# Patient Record
Sex: MALE | Race: WHITE | Employment: UNEMPLOYED | URBAN - METROPOLITAN AREA
[De-identification: names, ages, dates, MRNs, and addresses within clinical notes are randomized per-mention and may not be internally consistent; named-entity substitution may affect disease eponyms.]

---
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
## 2020-01-01 ENCOUNTER — HOSPITAL ENCOUNTER (INPATIENT)
Age: 20
LOS: 6 days | Discharge: HOME | DRG: 812 | End: 2020-01-07
Attending: EMERGENCY MEDICINE | Admitting: PSYCHIATRY & NEUROLOGY
Payer: MEDICAID

## 2020-01-01 DIAGNOSIS — T43.222A INTENTIONAL OVERDOSE OF SELECTIVE SEROTONIN REUPTAKE INHIBITOR (SSRI), INITIAL ENCOUNTER (HCC): Primary | ICD-10-CM

## 2020-01-01 DIAGNOSIS — R45.851 SUICIDAL IDEATION: ICD-10-CM

## 2020-01-01 DIAGNOSIS — R45.850 HOMICIDAL IDEATIONS: ICD-10-CM

## 2020-01-01 PROBLEM — F32.9 MAJOR DEPRESSION: Status: ACTIVE | Noted: 2020-01-01

## 2020-01-01 PROBLEM — F32.9 MAJOR DEPRESSION: Status: RESOLVED | Noted: 2020-01-01 | Resolved: 2020-01-01

## 2020-01-01 LAB
ALBUMIN SERPL-MCNC: 4.9 G/DL (ref 3.5–5)
ALBUMIN/GLOB SERPL: 1.5 {RATIO} (ref 1.1–2.2)
ALP SERPL-CCNC: 47 U/L (ref 45–117)
ALT SERPL-CCNC: 41 U/L (ref 12–78)
AMORPH CRY URNS QL MICRO: ABNORMAL
AMPHET UR QL SCN: NEGATIVE
ANION GAP SERPL CALC-SCNC: 13 MMOL/L (ref 5–15)
ANION GAP SERPL CALC-SCNC: 18 MMOL/L (ref 5–15)
APAP SERPL-MCNC: <2 UG/ML (ref 10–30)
APPEARANCE UR: ABNORMAL
AST SERPL-CCNC: 23 U/L (ref 15–37)
BACTERIA URNS QL MICRO: NEGATIVE /HPF
BARBITURATES UR QL SCN: NEGATIVE
BASOPHILS # BLD: 0 K/UL (ref 0–0.1)
BASOPHILS NFR BLD: 0 % (ref 0–1)
BENZODIAZ UR QL: NEGATIVE
BILIRUB SERPL-MCNC: 0.6 MG/DL (ref 0.2–1)
BILIRUB UR QL: NEGATIVE
BUN SERPL-MCNC: 10 MG/DL (ref 6–20)
BUN SERPL-MCNC: 8 MG/DL (ref 6–20)
BUN/CREAT SERPL: 12 (ref 12–20)
BUN/CREAT SERPL: 14 (ref 12–20)
CALCIUM SERPL-MCNC: 8.3 MG/DL (ref 8.5–10.1)
CALCIUM SERPL-MCNC: 9.8 MG/DL (ref 8.5–10.1)
CANNABINOIDS UR QL SCN: NEGATIVE
CAOX CRY URNS QL MICRO: ABNORMAL
CHLORIDE SERPL-SCNC: 103 MMOL/L (ref 97–108)
CHLORIDE SERPL-SCNC: 107 MMOL/L (ref 97–108)
CO2 SERPL-SCNC: 23 MMOL/L (ref 21–32)
CO2 SERPL-SCNC: 24 MMOL/L (ref 21–32)
COCAINE UR QL SCN: NEGATIVE
COLOR UR: ABNORMAL
CREAT SERPL-MCNC: 0.69 MG/DL (ref 0.7–1.3)
CREAT SERPL-MCNC: 0.71 MG/DL (ref 0.7–1.3)
DIFFERENTIAL METHOD BLD: NORMAL
DRUG SCRN COMMENT,DRGCM: NORMAL
EOSINOPHIL # BLD: 0 K/UL (ref 0–0.4)
EOSINOPHIL NFR BLD: 0 % (ref 0–7)
EPITH CASTS URNS QL MICRO: ABNORMAL /LPF
ERYTHROCYTE [DISTWIDTH] IN BLOOD BY AUTOMATED COUNT: 12.6 % (ref 11.5–14.5)
ETHANOL SERPL-MCNC: <10 MG/DL
GLOBULIN SER CALC-MCNC: 3.3 G/DL (ref 2–4)
GLUCOSE SERPL-MCNC: 122 MG/DL (ref 65–100)
GLUCOSE SERPL-MCNC: 94 MG/DL (ref 65–100)
GLUCOSE UR STRIP.AUTO-MCNC: NEGATIVE MG/DL
HCT VFR BLD AUTO: 45 % (ref 36.6–50.3)
HGB BLD-MCNC: 16 G/DL (ref 12.1–17)
HGB UR QL STRIP: NEGATIVE
IMM GRANULOCYTES # BLD AUTO: 0 K/UL (ref 0–0.04)
IMM GRANULOCYTES NFR BLD AUTO: 0 % (ref 0–0.5)
KETONES UR QL STRIP.AUTO: NEGATIVE MG/DL
LEUKOCYTE ESTERASE UR QL STRIP.AUTO: NEGATIVE
LYMPHOCYTES # BLD: 1.7 K/UL (ref 0.8–3.5)
LYMPHOCYTES NFR BLD: 19 % (ref 12–49)
MAGNESIUM SERPL-MCNC: 1.8 MG/DL (ref 1.6–2.4)
MCH RBC QN AUTO: 29.4 PG (ref 26–34)
MCHC RBC AUTO-ENTMCNC: 35.6 G/DL (ref 30–36.5)
MCV RBC AUTO: 82.7 FL (ref 80–99)
METHADONE UR QL: NEGATIVE
MONOCYTES # BLD: 0.9 K/UL (ref 0–1)
MONOCYTES NFR BLD: 10 % (ref 5–13)
NEUTS SEG # BLD: 6.3 K/UL (ref 1.8–8)
NEUTS SEG NFR BLD: 71 % (ref 32–75)
NITRITE UR QL STRIP.AUTO: NEGATIVE
NRBC # BLD: 0 K/UL (ref 0–0.01)
NRBC BLD-RTO: 0 PER 100 WBC
OPIATES UR QL: NEGATIVE
PCP UR QL: NEGATIVE
PH UR STRIP: 7.5 [PH] (ref 5–8)
PLATELET # BLD AUTO: 290 K/UL (ref 150–400)
PMV BLD AUTO: 10 FL (ref 8.9–12.9)
POTASSIUM SERPL-SCNC: 2.9 MMOL/L (ref 3.5–5.1)
POTASSIUM SERPL-SCNC: 3.5 MMOL/L (ref 3.5–5.1)
PROT SERPL-MCNC: 8.2 G/DL (ref 6.4–8.2)
PROT UR STRIP-MCNC: NEGATIVE MG/DL
RBC # BLD AUTO: 5.44 M/UL (ref 4.1–5.7)
RBC #/AREA URNS HPF: ABNORMAL /HPF (ref 0–5)
SALICYLATES SERPL-MCNC: <1.7 MG/DL (ref 2.8–20)
SODIUM SERPL-SCNC: 144 MMOL/L (ref 136–145)
SODIUM SERPL-SCNC: 144 MMOL/L (ref 136–145)
SP GR UR REFRACTOMETRY: 1.02 (ref 1–1.03)
UROBILINOGEN UR QL STRIP.AUTO: 1 EU/DL (ref 0.2–1)
WBC # BLD AUTO: 9.1 K/UL (ref 4.1–11.1)
WBC URNS QL MICRO: ABNORMAL /HPF (ref 0–4)

## 2020-01-01 PROCEDURE — 74011250636 HC RX REV CODE- 250/636: Performed by: EMERGENCY MEDICINE

## 2020-01-01 PROCEDURE — 65220000003 HC RM SEMIPRIVATE PSYCH

## 2020-01-01 PROCEDURE — 83735 ASSAY OF MAGNESIUM: CPT

## 2020-01-01 PROCEDURE — 80307 DRUG TEST PRSMV CHEM ANLYZR: CPT

## 2020-01-01 PROCEDURE — 74011250636 HC RX REV CODE- 250/636

## 2020-01-01 PROCEDURE — 74011250637 HC RX REV CODE- 250/637: Performed by: EMERGENCY MEDICINE

## 2020-01-01 PROCEDURE — 81001 URINALYSIS AUTO W/SCOPE: CPT

## 2020-01-01 PROCEDURE — 96375 TX/PRO/DX INJ NEW DRUG ADDON: CPT

## 2020-01-01 PROCEDURE — 74011250637 HC RX REV CODE- 250/637: Performed by: PSYCHIATRY & NEUROLOGY

## 2020-01-01 PROCEDURE — 85025 COMPLETE CBC W/AUTO DIFF WBC: CPT

## 2020-01-01 PROCEDURE — 93005 ELECTROCARDIOGRAM TRACING: CPT

## 2020-01-01 PROCEDURE — 96374 THER/PROPH/DIAG INJ IV PUSH: CPT

## 2020-01-01 PROCEDURE — 36415 COLL VENOUS BLD VENIPUNCTURE: CPT

## 2020-01-01 PROCEDURE — 80053 COMPREHEN METABOLIC PANEL: CPT

## 2020-01-01 PROCEDURE — 99285 EMERGENCY DEPT VISIT HI MDM: CPT

## 2020-01-01 PROCEDURE — 74011000250 HC RX REV CODE- 250: Performed by: EMERGENCY MEDICINE

## 2020-01-01 PROCEDURE — 96376 TX/PRO/DX INJ SAME DRUG ADON: CPT

## 2020-01-01 RX ORDER — ONDANSETRON 2 MG/ML
4 INJECTION INTRAMUSCULAR; INTRAVENOUS
Status: COMPLETED | OUTPATIENT
Start: 2020-01-01 | End: 2020-01-01

## 2020-01-01 RX ORDER — LORAZEPAM 2 MG/ML
1 INJECTION INTRAMUSCULAR
Status: DISCONTINUED | OUTPATIENT
Start: 2020-01-01 | End: 2020-01-07 | Stop reason: HOSPADM

## 2020-01-01 RX ORDER — LORAZEPAM 2 MG/ML
INJECTION INTRAMUSCULAR
Status: COMPLETED
Start: 2020-01-01 | End: 2020-01-01

## 2020-01-01 RX ORDER — LORAZEPAM 2 MG/ML
1 INJECTION INTRAMUSCULAR
Status: COMPLETED | OUTPATIENT
Start: 2020-01-01 | End: 2020-01-01

## 2020-01-01 RX ORDER — FLUOXETINE HYDROCHLORIDE 60 MG/1
TABLET, FILM COATED ORAL; ORAL
COMMUNITY
End: 2020-01-07

## 2020-01-01 RX ORDER — BENZTROPINE MESYLATE 1 MG/1
1 TABLET ORAL
Status: DISCONTINUED | OUTPATIENT
Start: 2020-01-01 | End: 2020-01-07 | Stop reason: HOSPADM

## 2020-01-01 RX ORDER — HYDROXYZINE 25 MG/1
50 TABLET, FILM COATED ORAL
Status: DISCONTINUED | OUTPATIENT
Start: 2020-01-01 | End: 2020-01-07 | Stop reason: HOSPADM

## 2020-01-01 RX ORDER — CLONIDINE HYDROCHLORIDE 0.1 MG/1
0.2 TABLET ORAL
Status: DISCONTINUED | OUTPATIENT
Start: 2020-01-01 | End: 2020-01-07 | Stop reason: HOSPADM

## 2020-01-01 RX ORDER — ACETAMINOPHEN 325 MG/1
650 TABLET ORAL
Status: DISCONTINUED | OUTPATIENT
Start: 2020-01-01 | End: 2020-01-07 | Stop reason: HOSPADM

## 2020-01-01 RX ORDER — DIPHENHYDRAMINE HYDROCHLORIDE 50 MG/ML
50 INJECTION, SOLUTION INTRAMUSCULAR; INTRAVENOUS
Status: DISCONTINUED | OUTPATIENT
Start: 2020-01-01 | End: 2020-01-07 | Stop reason: HOSPADM

## 2020-01-01 RX ORDER — POTASSIUM CHLORIDE 750 MG/1
40 TABLET, FILM COATED, EXTENDED RELEASE ORAL
Status: COMPLETED | OUTPATIENT
Start: 2020-01-01 | End: 2020-01-01

## 2020-01-01 RX ORDER — GABAPENTIN 300 MG/1
600 CAPSULE ORAL
Status: COMPLETED | OUTPATIENT
Start: 2020-01-01 | End: 2020-01-01

## 2020-01-01 RX ORDER — FLUOXETINE HYDROCHLORIDE 20 MG/1
40 CAPSULE ORAL DAILY
Status: DISCONTINUED | OUTPATIENT
Start: 2020-01-02 | End: 2020-01-07 | Stop reason: HOSPADM

## 2020-01-01 RX ORDER — ADHESIVE BANDAGE
30 BANDAGE TOPICAL DAILY PRN
Status: DISCONTINUED | OUTPATIENT
Start: 2020-01-01 | End: 2020-01-07 | Stop reason: HOSPADM

## 2020-01-01 RX ORDER — SODIUM CHLORIDE 0.9 % (FLUSH) 0.9 %
10 SYRINGE (ML) INJECTION AS NEEDED
Status: DISCONTINUED | OUTPATIENT
Start: 2020-01-01 | End: 2020-01-01

## 2020-01-01 RX ORDER — OLANZAPINE 5 MG/1
5 TABLET ORAL
Status: DISCONTINUED | OUTPATIENT
Start: 2020-01-01 | End: 2020-01-07 | Stop reason: HOSPADM

## 2020-01-01 RX ORDER — CLONIDINE HYDROCHLORIDE 0.2 MG/1
TABLET ORAL 2 TIMES DAILY
COMMUNITY
End: 2020-01-07

## 2020-01-01 RX ORDER — HALOPERIDOL 5 MG/ML
5 INJECTION INTRAMUSCULAR
Status: DISCONTINUED | OUTPATIENT
Start: 2020-01-01 | End: 2020-01-07 | Stop reason: HOSPADM

## 2020-01-01 RX ORDER — TRAZODONE HYDROCHLORIDE 50 MG/1
50 TABLET ORAL
Status: DISCONTINUED | OUTPATIENT
Start: 2020-01-01 | End: 2020-01-07 | Stop reason: HOSPADM

## 2020-01-01 RX ADMIN — LORAZEPAM 1 MG: 2 INJECTION, SOLUTION INTRAMUSCULAR; INTRAVENOUS at 01:22

## 2020-01-01 RX ADMIN — GABAPENTIN 600 MG: 300 CAPSULE ORAL at 17:48

## 2020-01-01 RX ADMIN — CLONIDINE HYDROCHLORIDE 0.2 MG: 0.1 TABLET ORAL at 21:25

## 2020-01-01 RX ADMIN — ONDANSETRON 4 MG: 2 SOLUTION INTRAMUSCULAR; INTRAVENOUS at 08:04

## 2020-01-01 RX ADMIN — SODIUM CHLORIDE 1000 ML: 900 INJECTION, SOLUTION INTRAVENOUS at 01:21

## 2020-01-01 RX ADMIN — ACTIVATED CHARCOAL 50 G: 208 SUSPENSION ORAL at 02:41

## 2020-01-01 RX ADMIN — POTASSIUM CHLORIDE 40 MEQ: 750 TABLET, EXTENDED RELEASE ORAL at 02:58

## 2020-01-01 RX ADMIN — LORAZEPAM 1 MG: 2 INJECTION INTRAMUSCULAR at 01:22

## 2020-01-01 RX ADMIN — ONDANSETRON 4 MG: 2 SOLUTION INTRAMUSCULAR; INTRAVENOUS at 02:13

## 2020-01-01 RX ADMIN — TRAZODONE HYDROCHLORIDE 50 MG: 50 TABLET ORAL at 21:26

## 2020-01-01 RX ADMIN — GABAPENTIN 600 MG: 300 CAPSULE ORAL at 21:26

## 2020-01-01 RX ADMIN — LORAZEPAM 1 MG: 2 INJECTION, SOLUTION INTRAMUSCULAR; INTRAVENOUS at 09:45

## 2020-01-01 RX ADMIN — LURASIDONE HYDROCHLORIDE 20 MG: 20 TABLET, FILM COATED ORAL at 17:49

## 2020-01-01 RX ADMIN — Medication 10 ML: at 09:45

## 2020-01-01 RX ADMIN — ONDANSETRON 4 MG: 2 SOLUTION INTRAMUSCULAR; INTRAVENOUS at 09:48

## 2020-01-01 RX ADMIN — GABAPENTIN 600 MG: 300 CAPSULE ORAL at 12:57
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
## 2020-01-01 NOTE — ED NOTES
TRANSFER - OUT REPORT:    Verbal report given to Chanel Orta RN on [facility]  being transferred to behavioral health general for routine progression of care       Report consisted of patients Situation, Background, Assessment and   Recommendations(SBAR). Information from the following report(s) SBAR, ED Summary, [facility] and Recent Results was reviewed with the receiving nurse. Lines:       Opportunity for questions and clarification was provided.       Patient transported with: security
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
## 2020-01-01 NOTE — ED NOTES
Spoke with Carlita Marks from [facility]. She has concerns of Anion Gap and suggests BMP redraw at 0500. MD BaileyCecilia aware.

## 2020-01-01 NOTE — BSMART NOTE
Patient is medically clear as of 8am as reported by ED staff. Labs that Poison Control were concerned about are good.
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
## 2020-01-01 NOTE — ED NOTES
Bedside and Verbal shift change report given to [address] (oncoming nurse) by Justyn Guevara (offgoing nurse). Report included the following information SBAR, Kardex, ED Summary, [facility] and Recent Results.

## 2020-01-01 NOTE — ED NOTES
Pt alert and oriented, in no acute distress, resting in bed with bed in low position and wheels locked, call bell in reach. Pt's grandmother at bedside. Pt has hiccups, reports he has had them for 3 days. Pt encouraged to try vagal maneuvers. Pt denies pain. Pt asked about our behavioral health unit. Pt reports his last behavioral health admission was about three years ago. He has only experienced admission to pediatric behavioral health units.

## 2020-01-01 NOTE — ED NOTES
Pt reporting SI and overdose on fluoxetine x30 mins PTA in ED (16 pills of 60 mg fluoxetine). Pt reporting suicide attempt x30 mins ago by overdose. Pt also reporting HI towards mom and dad yesterday. Pt reports he has been admitted [facility] for mental health 3-4 years ago. He is very compliant with staff and would like psychiatric help after being medically cleared. Mild hyperreflexia noted to bilateral lower extremities x2. Pt is diaphoretic and skin is hot and red upon first assessment. He is reporting feeling anxious and visibly restless. Call bell within reach, safety precautions in place, bed locked and in the lowest position. His grandmother whom he lives with is very supportive and at bedside. He does feel comfortable talking about things in front of her. Emergency Department Nursing Plan of Care       The Nursing Plan of Care is developed from the Nursing assessment and Emergency Department Attending provider initial evaluation. The plan of care may be reviewed in the ED Provider note.     The Plan of Care was developed with the following considerations:   Patient / Family readiness to learn indicated by:verbalized understanding  Persons(s) to be included in education: patient  Barriers to Learning/Limitations:No    Signed     Judie Sykes RN    1/1/2020   7:07 AM

## 2020-01-01 NOTE — ED NOTES
Pt's grandmother left and will await a call from patient with his access code to visit patient during 3rd floor visiting hours.

## 2020-01-01 NOTE — ED TRIAGE NOTES
C/o mental health problem and overdose on fluoxitine x 30 mins PTA in ED (16 pills of 60 mg fluoxitine). Pt reporting suicide attempt 30 mins ago by overdose. Pt also reporting HI towards mom and dad yesterday. Pt reports he has been admitted to [facility] for mental health 3-4 years ago.
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
## 2020-01-01 NOTE — ED NOTES
Spoke with Maylin Rios from [facility]; recommended 50mg activated charcoal for this pt and observation x8hrs minimum. Also recommends PRN IV benzodiazepines. MD Cecilia aware.
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
## 2020-01-02 LAB
ATRIAL RATE: 97 BPM
CALCULATED P AXIS, ECG09: 62 DEGREES
CALCULATED R AXIS, ECG10: 20 DEGREES
CALCULATED T AXIS, ECG11: 31 DEGREES
DIAGNOSIS, 93000: NORMAL
P-R INTERVAL, ECG05: 118 MS
Q-T INTERVAL, ECG07: 348 MS
QRS DURATION, ECG06: 86 MS
QTC CALCULATION (BEZET), ECG08: 441 MS
VENTRICULAR RATE, ECG03: 97 BPM

## 2020-01-02 PROCEDURE — 74011250637 HC RX REV CODE- 250/637: Performed by: PSYCHIATRY & NEUROLOGY

## 2020-01-02 PROCEDURE — 65220000003 HC RM SEMIPRIVATE PSYCH

## 2020-01-02 RX ORDER — GABAPENTIN 300 MG/1
600 CAPSULE ORAL
Status: DISCONTINUED | OUTPATIENT
Start: 2020-01-02 | End: 2020-01-07 | Stop reason: HOSPADM

## 2020-01-02 RX ADMIN — CLONIDINE HYDROCHLORIDE 0.2 MG: 0.1 TABLET ORAL at 22:00

## 2020-01-02 RX ADMIN — LURASIDONE HYDROCHLORIDE 20 MG: 20 TABLET, FILM COATED ORAL at 16:58

## 2020-01-02 RX ADMIN — TRAZODONE HYDROCHLORIDE 50 MG: 50 TABLET ORAL at 22:00

## 2020-01-02 NOTE — BH NOTES
1900-Patient report received from Kierra Mayes RN    1950-Patient denies SI/HI and AVH. Patient was very tearful. Patient stated he felt safe here and was hopeful for a good outcome. Patient expressed being very tired and happy to just rest in the dayroom. 2125-Patient stats he still feels very tired and was resting in his room.  Patient given scheduled meds along with prn trazodone for sleep 50 mg po    0625- patient slept 10 hours
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
## 2020-01-02 NOTE — GROUP NOTE
LAYNE  GROUP DOCUMENTATION INDIVIDUAL                                                                          Group Therapy Note    Date: 1/2/2020    Group Start Time: 1000  Group End Time: 1100  Group Topic: Topic Group    [address] [place] [address] 3 ACUTE BEHAV [facility], [facility] GROUP DOCUMENTATION GROUP    Group Therapy Note    Attendees: 4         Attendance: Attended    Patient's Goal:  To develop a personal plan for success    Interventions/techniques: Supported-positive coping strategies, goals    Follows Directions:  Followed directions    Interactions: Interacted appropriately    Mental Status: Calm    Behavior/appearance: Attentive, Cooperative and Needed prompting    Goals Achieved: Able to engage in interactions, Able to listen to others and Discussed coping      Additional Notes:      Edgar Padilla

## 2020-01-02 NOTE — PROGRESS NOTES
Laboratory monitoring for mood stabilizer and antipsychotics:    Recommended baseline monitoring has been completed based on this patient's current medication regimen. The patient is currently taking the following medication(s):   Current Facility-Administered Medications   Medication Dose Route Frequency    lurasidone (LATUDA) tablet 20 mg  20 mg Oral DAILY WITH DINNER    FLUoxetine (PROzac) capsule 40 mg  40 mg Oral DAILY    cloNIDine HCl (CATAPRES) tablet 0.2 mg  0.2 mg Oral QHS    influenza vaccine 2019-20 (6 mos+)(PF) (FLUARIX/FLULAVAL/FLUZONE QUAD) injection 0.5 mL  0.5 mL IntraMUSCular PRIOR TO DISCHARGE       Height, Weight, BMI Estimation  Estimated body mass index is 23.73 kg/m² as calculated from the following:    Height as of this encounter: 167.6 cm (66\"). Weight as of this encounter: 66.7 kg (147 lb). Renal Function, Hepatic Function and Chemistry  Estimated Creatinine Clearance: 151.9 mL/min (A) (by C-G formula based on SCr of 0.69 mg/dL (L)). Lab Results   Component Value Date/Time    Sodium 144 01/01/2020 05:29 AM    Potassium 3.5 01/01/2020 05:29 AM    Chloride 107 01/01/2020 05:29 AM    CO2 24 01/01/2020 05:29 AM    Anion gap 13 01/01/2020 05:29 AM    Glucose 122 (H) 01/01/2020 05:29 AM    BUN 8 01/01/2020 05:29 AM    Creatinine 0.69 (L) 01/01/2020 05:29 AM    BUN/Creatinine ratio 12 01/01/2020 05:29 AM    GFR est AA >60 01/01/2020 05:29 AM    GFR est non-AA >60 01/01/2020 05:29 AM    Calcium 8.3 (L) 01/01/2020 05:29 AM    ALT (SGPT) 41 01/01/2020 01:05 AM    AST (SGOT) 23 01/01/2020 01:05 AM    Alk.  phosphatase 47 01/01/2020 01:05 AM    Protein, total 8.2 01/01/2020 01:05 AM    Albumin 4.9 01/01/2020 01:05 AM    Globulin 3.3 01/01/2020 01:05 AM    A-G Ratio 1.5 01/01/2020 01:05 AM    Bilirubin, total 0.6 01/01/2020 01:05 AM       Lab Results   Component Value Date/Time    Glucose 122 (H) 01/01/2020 05:29 AM    Glucose (POC) 98 12/26/2014 10:22 PM       Lab Results   Component Value Date/Time    Hemoglobin A1c 4.8 01/03/2020 05:06 AM       Hematology  Lab Results   Component Value Date/Time    WBC 9.1 01/01/2020 01:05 AM    HGB 16.0 01/01/2020 01:05 AM    HCT 45.0 01/01/2020 01:05 AM    PLATELET 770 37/30/6069 01:05 AM    MCV 82.7 01/01/2020 01:05 AM       Lipids  Lab Results   Component Value Date/Time    Cholesterol, total 151 01/03/2020 05:06 AM    HDL Cholesterol 42 01/03/2020 05:06 AM    LDL, calculated 87.4 01/03/2020 05:06 AM    Triglyceride 108 01/03/2020 05:06 AM    CHOL/HDL Ratio 3.6 01/03/2020 05:06 AM       Thyroid Function    Lab Results   Component Value Date/Time    TSH 4.190 04/12/2018 10:53 AM     Vitals  Visit Vitals  /67 (BP 1 Location: Left arm, BP Patient Position: Sitting)   Pulse 97   Temp 97.4 °F (36.3 °C)   Resp 17   Ht 167.6 cm (66\")   Wt 66.7 kg (147 lb)   SpO2 96%   BMI 23.73 kg/m²       Ruddy Lara, PharmD, BCPS  [phone] (pharmacy)

## 2020-01-02 NOTE — PROGRESS NOTES
Problem: Depressed Mood (Adult/Pediatric)  Goal: *STG: Participates in treatment plan  Outcome: Progressing Towards Goal  Goal: *STG: Verbalizes anger, guilt, and other feelings in a constructive manor  Outcome: Not Progressing Towards Goal  Goal: *STG: Attends activities and groups  Outcome: Progressing Towards Goal  Goal: *STG: Demonstrates reduction in symptoms and increase in insight into coping skills/future focused  Outcome: Progressing Towards Goal  Goal: *STG: Remains safe in hospital  Outcome: Progressing Towards Goal  Goal: *STG: Complies with medication therapy  Outcome: Progressing Towards Goal  Goal: *LTG: Returns to previous level of functioning and participates with after care plan  Outcome: Progressing Towards Goal  Goal: *LTG: Understands illness and can identify signs of relapse  Outcome: Progressing Towards Goal     0721 This writer received report from the outgoing nurse. 0830 Pt ate some of his breakfast.     0900 Pt in room resting quietly. Pt presents with a cooperative attitude, sad/flat affect. Pt presents with a depressed mood. Pt denied SI, HI, and AVH. Pt is alert and oriented x 4.     1015 Pt in room resting. Pt has been isolative to the room. Pt keeping to self.     1201 Pt in room resting quietly. Pt with orders for hemoglobin A1C and lipid panel labs tomorrow. 80 Pt visiting with family.
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
## 2020-01-02 NOTE — PROGRESS NOTES
Problem: Depressed Mood (Adult/Pediatric)  Goal: *STG: Attends activities and groups  Outcome: Progressing Towards Goal     Problem: Depressed Mood (Adult/Pediatric)  Goal: *STG: Remains safe in hospital  Outcome: Progressing Towards Goal

## 2020-01-02 NOTE — GROUP NOTE
LAYNE  GROUP DOCUMENTATION INDIVIDUAL                                                                          Group Therapy Note    Date: 1/2/2020    Group Start Time: 1500  Group End Time: 1600  Group Topic: Recreational/Music Therapy    [facility] [place] [facility] ACUTE BEHAV [facility]    [place] [facility] GROUP DOCUMENTATION GROUP    Group Therapy Note    Attendees: 8         Attendance: Attended    Patient's Goal:  To concentrate on selected task    Interventions/techniques: Supported-crafts,games,music    Follows Directions:  Followed directions    Interactions: Interacted appropriately    Mental Status: Calm    Behavior/appearance: Attentive and Cooperative    Goals Achieved: Able to engage in interactions and Able to listen to others      Additional Notes:      Judit Veronica

## 2020-01-02 NOTE — BH NOTES
Behavioral Health Interdisciplinary Rounds     Patient Name: Vannessa Burnett  Age: 20 y.o. Room/Bed:  326/01  Primary Diagnosis: MDD (major depressive disorder)   Admission Status: Voluntary     Readmission within 30 days: no  Power of  in place: no  Patient requires a blocked bed: no            Reason for blocked bed: n/a  Order for blocked bed obtained: no       Sleep hours: 10       Participation in Care/Groups:  no  Medication Compliant?: Yes  PRNS (last 24 hours): Sleep Aid    Restraints (last 24 hours):  no  Substance Abuse:  no    24 hour chart check complete: yes     Behavioral Health Treatment Team Note     Patient goal(s) for today: Engage in unit activities. Treatment team focus/goals: Continue stabilizing and gain collateral from family. Progress note:  Pt endorses SI.  Pt's mood is depressed and affect is sad, tearful. Pt reports remaining anxious but had a good conversation with twin brother. LOS:  1  Expected LOS: TBD    Insurance info/prescription coverage:  Medicaid  Date of last family contact: Awaiting return call from sisterSandi Villanueva - sister [phone]  Awaiting return call. Grandmother -Rollo Goad  Family requesting physician contact today:  No  Discharge plan:  Grandmother's   Guns in the home:  None involved. Outpatient provider(s): Warner Castorena and therapist    Participating treatment team members: RICARDO Ruiz and Dr. Jeff Bazzi.

## 2020-01-03 LAB
CHOLEST SERPL-MCNC: 151 MG/DL
EST. AVERAGE GLUCOSE BLD GHB EST-MCNC: 91 MG/DL
HBA1C MFR BLD: 4.8 % (ref 4–5.6)
HDLC SERPL-MCNC: 42 MG/DL
HDLC SERPL: 3.6 {RATIO} (ref 0–5)
LDLC SERPL CALC-MCNC: 87.4 MG/DL (ref 0–100)
LIPID PROFILE,FLP: NORMAL
TRIGL SERPL-MCNC: 108 MG/DL (ref ?–150)
VLDLC SERPL CALC-MCNC: 21.6 MG/DL

## 2020-01-03 PROCEDURE — 74011250637 HC RX REV CODE- 250/637: Performed by: PSYCHIATRY & NEUROLOGY

## 2020-01-03 PROCEDURE — 36415 COLL VENOUS BLD VENIPUNCTURE: CPT

## 2020-01-03 PROCEDURE — 80061 LIPID PANEL: CPT

## 2020-01-03 PROCEDURE — 83036 HEMOGLOBIN GLYCOSYLATED A1C: CPT

## 2020-01-03 PROCEDURE — 65220000003 HC RM SEMIPRIVATE PSYCH

## 2020-01-03 RX ADMIN — CLONIDINE HYDROCHLORIDE 0.2 MG: 0.1 TABLET ORAL at 21:09

## 2020-01-03 RX ADMIN — LURASIDONE HYDROCHLORIDE 20 MG: 20 TABLET, FILM COATED ORAL at 17:32

## 2020-01-03 RX ADMIN — HYDROXYZINE HYDROCHLORIDE 50 MG: 25 TABLET, FILM COATED ORAL at 21:09

## 2020-01-03 RX ADMIN — TRAZODONE HYDROCHLORIDE 50 MG: 50 TABLET ORAL at 21:09

## 2020-01-03 NOTE — GROUP NOTE
LAYNE  GROUP DOCUMENTATION INDIVIDUAL                                                                          Group Therapy Note    Date: 1/3/2020    Group Start Time: 1000  Group End Time: 1100  Group Topic: Topic Group    [facility] [place] 3 ACUTE BEHAV [facility]    [place] [facility] GROUP DOCUMENTATION GROUP    Group Therapy Note    Attendees: 7         Attendance: Attended    Patient's Goal:  To participate in healthy relationships [employer] game    Interventions/techniques: Supported-things pertaining to relationships    Follows Directions:  Followed directions    Interactions: Interacted appropriately    Mental Status: Calm and Flat    Behavior/appearance: Attentive, Cooperative and Needed prompting    Goals Achieved: Able to engage in interactions and Able to listen to others      Additional Notes:      Margot Reinoso

## 2020-01-03 NOTE — BH NOTES
Pt has sad and flat affect. Pt denies suicidal and homicidal ideations; denies hallucinations. Pt is med compliant and attends groups. Pt eats all meals. No acute distress; continue to monitor and provide support when needed.

## 2020-01-03 NOTE — GROUP NOTE
LAYNE  GROUP DOCUMENTATION INDIVIDUAL                                                                          Group Therapy Note    Date: 1/3/2020    Group Start Time: 1500  Group End Time: 1600  Group Topic: Recreational/Music Therapy    [facility] [place] [address] ACUTE BEHAV [facility]    [place] [facility] GROUP DOCUMENTATION GROUP    Group Therapy Note    Attendees: 6         Attendance: Attended    Patient's Goal:  To concentrate on selected task    Interventions/techniques: Supported-crafts,games,music    Follows Directions:  Followed directions    Interactions: Interacted appropriately    Mental Status: flat,calm    Behavior/appearance: Attentive, Cooperative and Needed prompting    Goals Achieved: Able to engage in interactions and Able to listen to others -completed selected task with encouragement    Additional Notes:      Dewayne Garcia

## 2020-01-03 NOTE — BH NOTES
Behavioral Health Interdisciplinary Rounds     Patient Name: Taj Calle  Age: 20 y.o. Room/Bed:  326/01  Primary Diagnosis: MDD (major depressive disorder)   Admission Status: Voluntary     Readmission within 30 days: no  Power of  in place: no  Patient requires a blocked bed: no          Reason for blocked bed:     Sleep hours: 8.5       Participation in Care/Groups:  yes  Medication Compliant?: Yes  PRNS (last 24 hours): Sleep Aid    Restraints (last 24 hours):  no  Substance Abuse:  no    24 hour chart check complete: no     Patient goal(s) for today: Engage in unit activities. Treatment team focus/goals: Continue stabilizing on medication and contact support. Progress note: Pt communicating more with staff and amendable to start outpatient therapy. LOS:  2  Expected LOS:     Financial concerns/prescription coverage:  no  Family contact:  Sister- Hernandez Sandhu      Family requesting physician contact today:  no  Discharge plan: Grandmothers  Access to weapons: no        Outpatient provider(s): Warner HENDRICKSON  Patient's preferred phone number for follow up call:     Participating treatment team members: RICARDO Menchaca and Dr. Jaz Serrano.
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
## 2020-01-03 NOTE — GROUP NOTE
[facility] GROUP DOCUMENTATION INDIVIDUAL                                                                          Group Therapy Note    Date: 1/3/2020    Group Start Time: 7394  Group End Time: 3112  Group Topic: Social Work Group    [address] [facility]    [address] [facility] GROUP DOCUMENTATION GROUP    Group Therapy Note    Attendees: 6         Attendance: Attended    Patient's Goal:   Pt designed a \"life motto\" about what keeps him positive when he is depressed      Interventions/techniques: Challenged, Informed, Validated and Promoted peer support    Follows Directions: Followed directions    Interactions: Interacted appropriately    Mental Status: Anxious and Depressed    Behavior/appearance: Attentive and Cooperative    Goals Achieved: Able to engage in interactions, Able to listen to others, Able to give feedback to another and Able to reflect/comment on own behavior       Additional Notes:  Pt stated that he \"tries to remember to breathe\" when he is anxious. Pt interacted well with peers.   Kendal Lombardo

## 2020-01-03 NOTE — PROGRESS NOTES
Problem: Depressed Mood (Adult/Pediatric)  Goal: *STG: Participates in treatment plan  Outcome: Progressing Towards Goal  Goal: *STG: Participates in 1:1 therapy sessions  Outcome: Progressing Towards Goal  Goal: *STG: Verbalizes anger, guilt, and other feelings in a constructive manor  Outcome: Progressing Towards Goal  Goal: *STG: Attends activities and groups  Outcome: Progressing Towards Goal  Goal: *STG: Remains safe in hospital  Outcome: Progressing Towards Goal  Goal: *STG: Complies with medication therapy  Outcome: Progressing Towards Goal     Problem: Falls - Risk of  Goal: *Absence of Falls  Description  Document Jacquelyn Fall Risk and appropriate interventions in the flowsheet.   Outcome: Progressing Towards Goal  Note: Fall Risk Interventions:

## 2020-01-03 NOTE — BH NOTES
1900-Received report from Valerie Colorado, GERARDO    Patient is up in dayroom at beginning of shift working on a crossword puzzle. He is cooperative and pleasant. He is quiet and isolative not interacting with peers. Patient denies SI, HI and AVH. He requested something to help him sleep with his bedtime medications.  PRN Trazodone given at 2200.     0515-Labs drawn succssfully    Patient slept for 8.5 hours

## 2020-01-04 PROCEDURE — 74011250637 HC RX REV CODE- 250/637: Performed by: PSYCHIATRY & NEUROLOGY

## 2020-01-04 PROCEDURE — 65220000003 HC RM SEMIPRIVATE PSYCH

## 2020-01-04 RX ADMIN — TRAZODONE HYDROCHLORIDE 50 MG: 50 TABLET ORAL at 21:39

## 2020-01-04 RX ADMIN — CLONIDINE HYDROCHLORIDE 0.2 MG: 0.1 TABLET ORAL at 21:39

## 2020-01-04 RX ADMIN — FLUOXETINE 40 MG: 20 CAPSULE ORAL at 09:04

## 2020-01-04 RX ADMIN — HYDROXYZINE HYDROCHLORIDE 50 MG: 25 TABLET, FILM COATED ORAL at 21:39

## 2020-01-04 RX ADMIN — LURASIDONE HYDROCHLORIDE 20 MG: 20 TABLET, FILM COATED ORAL at 17:41

## 2020-01-04 NOTE — BH NOTES
Report received from Jo Vásquez RN. Patient currently in dayroom watching TV.    2005  Patient denies SI/HI at this time. Patient does endorse anxiety due to being the youngest patient on the floor, but states he is usually anxious all the time. 2109  Patient given scheduled Clonodine and prn atarax for anxiety and trazodone for sleep. 0020  Patient in bed sleeping. 0330  Patient in bed sleeping. 4584  Patient remained in bed throughout night.       Patient slept 8 hours

## 2020-01-04 NOTE — PROGRESS NOTES
Problem: Suicide  Goal: *ABLE TO CONTROL ANXIETY RESPONSE  Outcome: Progressing Towards Goal     Problem: Depressed Mood (Adult/Pediatric)  Goal: *STG: Participates in treatment plan  Outcome: Progressing Towards Goal  Goal: *STG: Demonstrates reduction in symptoms and increase in insight into coping skills/future focused  Outcome: Progressing Towards Goal     Problem: Depressed Mood (Adult/Pediatric)  Goal: *STG: Complies with medication therapy  Outcome: Resolved/Met

## 2020-01-04 NOTE — BH NOTES
Behavioral Health Interdisciplinary Rounds     Patient Name: Froy Hurd  Age: 20 y.o. Room/Bed:  [facility]/  Primary Diagnosis: MDD (major depressive disorder)   Admission Status: Voluntary     Readmission within 30 days: no  Power of  in place: no  Patient requires a blocked bed: no            Reason for blocked bed: n/a  Order for blocked bed obtained: no       Sleep hours: 8       Participation in Care/Groups:  yes  Medication Compliant?: Yes  PRNS (last 24 hours):  Antianxiety and Sleep Aid    Restraints (last 24 hours):  no  Substance Abuse:  no    24 hour chart check complete: yes

## 2020-01-04 NOTE — PROGRESS NOTES
Problem: Suicide  Goal: *ABLE TO CONTROL ANXIETY RESPONSE  Outcome: Progressing Towards Goal     Problem: Post Traumatic Stress (Adult/Pediatric)  Goal: *ABLE TO CONTROL ANXIETY RESPONSE  Outcome: Progressing Towards Goal     Problem: Depressed Mood (Adult/Pediatric)  Goal: *STG: Participates in treatment plan  Outcome: Progressing Towards Goal  Goal: *STG: Participates in 1:1 therapy sessions  Outcome: Progressing Towards Goal  Goal: *STG: Verbalizes anger, guilt, and other feelings in a constructive manor  Outcome: Progressing Towards Goal  Goal: *STG: Attends activities and groups  Outcome: Progressing Towards Goal  Goal: *STG: Demonstrates reduction in symptoms and increase in insight into coping skills/future focused  Outcome: Progressing Towards Goal  Goal: *STG: Remains safe in hospital  Outcome: Progressing Towards Goal  Goal: *LTG: Returns to previous level of functioning and participates with after care plan  Outcome: Progressing Towards Goal  Goal: *LTG: Understands illness and can identify signs of relapse  Outcome: Progressing Towards Goal  Goal: Interventions  Outcome: Progressing Towards Goal     Problem: Patient Education: Go to Patient Education Activity  Goal: Patient/Family Education  Outcome: Progressing Towards Goal     Problem: Falls - Risk of  Goal: *Absence of Falls  Description  Document You Gomez Fall Risk and appropriate interventions in the flowsheet.   Outcome: Progressing Towards Goal  Note: Fall Risk Interventions:                                Problem: Patient Education: Go to Patient Education Activity  Goal: Patient/Family Education  Outcome: Progressing Towards Goal

## 2020-01-04 NOTE — GROUP NOTE
[facility] GROUP DOCUMENTATION INDIVIDUAL                                                                          Group Therapy Note    Date: 1/4/2020    Group Start Time: 1430  Group End Time: 1630  Group Topic: Social Work Group    [facility] [place]    [address] [facility] GROUP DOCUMENTATION GROUP    Group Therapy Note    Attendees: 7         Attendance: Attended    Patient's Goal:  Pt watched a film about emotions and how they affect the thought process. Pt discussed how emotions take over and things may not be rational.     Interventions/techniques: Art integration, Challenged, Validated and Provide feedback    Follows Directions: Followed directions    Interactions: Interacted appropriately    Mental Status: Anxious and Depressed    Behavior/appearance: Attentive and Cooperative    Goals Achieved: Able to engage in interactions, Able to listen to others and Able to manage/cope with feelings      Additional Notes:  Pt was emotional at times during the film.  Pt stated that he could relate to how his emotions can take over, and cause his thoughts to \"go to dark places\"    Josh Correa

## 2020-01-04 NOTE — GROUP NOTE
[facility]  GROUP DOCUMENTATION INDIVIDUAL                                                                          Group Therapy Note    Date: 1/4/2020    Group Start Time: 0930  Group End Time: 8023  Group Topic: Social Work Group    [address] [facility]    [address] [facility] GROUP DOCUMENTATION GROUP    Group Therapy Note    Attendees:5         Attendance: Attended    Patient's Goal:   Pt worked with group to create a \"body map\" of where anxiety can be felt in the body. Pt mapped out where in the body he felt the most anxious. Pt completed a Progressive Muscle Relaxation Technique where he could focus on where he felt anxious, and release it. Interventions/techniques: Art integration, Challenged, Informed, Validated, Promoted peer support, Provide feedback and Reinforced    Follows Directions: Followed directions    Interactions: Interacted appropriately    Mental Status: Anxious and Depressed    Behavior/appearance: Attentive and Cooperative    Goals Achieved: Able to engage in interactions, Able to listen to others, Able to give feedback to another, Able to reflect/comment on own behavior, Able to manage/cope with feelings and Able to receive feedback    Additional Notes:  Pt offered excellent feedback to peers and was engaged in group. Pt stated he feels his anxiety all through his midsection.  Pt had a hard time with the PMR exercise    Bella Lopez

## 2020-01-04 NOTE — BH NOTES
0800 pt is visible on the unit. Ate breakfast. Denies si/hi/a/v barreto. Pt states he feels anxious and depressed. Sad. Flat. Affect. Medication complaint. 1000 pt is resting in room. 1200 pt is visible on the unit. Calm and cooperative. Ate lunch. 1400 pt In resting in room. 1600 pt is visible on the unit. Watching tv.     1800 ate lunch. Pt is resting in room.
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
## 2020-01-05 PROCEDURE — 65220000003 HC RM SEMIPRIVATE PSYCH

## 2020-01-05 PROCEDURE — 74011250637 HC RX REV CODE- 250/637: Performed by: PSYCHIATRY & NEUROLOGY

## 2020-01-05 RX ADMIN — FLUOXETINE 40 MG: 20 CAPSULE ORAL at 08:31

## 2020-01-05 RX ADMIN — TRAZODONE HYDROCHLORIDE 50 MG: 50 TABLET ORAL at 22:17

## 2020-01-05 RX ADMIN — HYDROXYZINE HYDROCHLORIDE 50 MG: 25 TABLET, FILM COATED ORAL at 22:17

## 2020-01-05 RX ADMIN — LURASIDONE HYDROCHLORIDE 20 MG: 20 TABLET, FILM COATED ORAL at 17:01

## 2020-01-05 RX ADMIN — CLONIDINE HYDROCHLORIDE 0.2 MG: 0.1 TABLET ORAL at 22:17

## 2020-01-05 NOTE — BH NOTES
Pt is pleasant and cooperative with staff and peers; med compliant; attends meals and groups; no acute distress; continue to monitor and provide support when needed.

## 2020-01-05 NOTE — PROGRESS NOTES
Problem: Depressed Mood (Adult/Pediatric)  Goal: *STG: Participates in treatment plan  Outcome: Progressing Towards Goal     Problem: Depressed Mood (Adult/Pediatric)  Goal: *STG: Verbalizes anger, guilt, and other feelings in a constructive manor  Outcome: Not Progressing Towards Goal     Problem: Depressed Mood (Adult/Pediatric)  Goal: *STG: Demonstrates reduction in symptoms and increase in insight into coping skills/future focused  Outcome: Progressing Towards Goal     Problem: Depressed Mood (Adult/Pediatric)  Goal: *STG: Remains safe in hospital  Outcome: Progressing Towards Goal   1900- Report given by Marline Lozoya RN  and I assume care of the patient. Patient is in th day room playing cards with peers. Patient denies SI/HI/AH/VH. Patient has a flat affect but calm. 2030- Patient is eating a snack in the day room. 2135- Patient is sitting in the day room and compliant with medications. Patient given Atarax and Trazodone for sleep and anxiety. 2210- Patient is now resting in bed. 0030- Patient is asleep in bed. 0130- Patient is asleep in bed.

## 2020-01-05 NOTE — GROUP NOTE
LAYNE  GROUP DOCUMENTATION INDIVIDUAL                                                                          Group Therapy Note    Date: 1/5/2020    Group Start Time: 0915  Group End Time: 1415  Group Topic: Social Work Group    [address] [facility]    [address] [facility] GROUP DOCUMENTATION GROUP    Group Therapy Note    Attendees: 8         Attendance: Attended    Patient's Goal:   Pt discussed \"What is Spirituality? \" and gave examples of how our spirits can be broken and how they can heal. Pt processed how they practice spirituality in their every day life. Interventions/techniques: Challenged, Informed, Validated and Promoted peer support    Follows Directions: Followed directions    Interactions: Interacted appropriately    Mental Status: Anxious and Flat    Behavior/appearance: Cooperative and Poor eye contact    Goals Achieved: Able to listen to others, Able to give feedback to another and Able to reflect/comment on own behavior      Additional Notes:  Pt was actively listening during discussion, but participated very little. Pt stated that his spirituality is \"how he feels safe. \"     Mely Choudhary

## 2020-01-05 NOTE — PROGRESS NOTES
Problem: Suicide  Goal: *ABLE TO CONTROL ANXIETY RESPONSE  Outcome: Progressing Towards Goal     Problem: Post Traumatic Stress (Adult/Pediatric)  Goal: *ABLE TO CONTROL ANXIETY RESPONSE  Outcome: Progressing Towards Goal     Problem: Depressed Mood (Adult/Pediatric)  Goal: *STG: Participates in treatment plan  Outcome: Progressing Towards Goal  Goal: *STG: Participates in 1:1 therapy sessions  Outcome: Progressing Towards Goal  Goal: *STG: Verbalizes anger, guilt, and other feelings in a constructive manor  Outcome: Progressing Towards Goal  Goal: *STG: Attends activities and groups  Outcome: Progressing Towards Goal  Goal: *STG: Demonstrates reduction in symptoms and increase in insight into coping skills/future focused  Outcome: Progressing Towards Goal  Goal: *STG: Remains safe in hospital  Outcome: Progressing Towards Goal  Goal: *LTG: Returns to previous level of functioning and participates with after care plan  Outcome: Progressing Towards Goal  Goal: *LTG: Understands illness and can identify signs of relapse  Outcome: Progressing Towards Goal  Goal: Interventions  Outcome: Progressing Towards Goal     Problem: Patient Education: Go to Patient Education Activity  Goal: Patient/Family Education  Outcome: Progressing Towards Goal     Problem: Falls - Risk of  Goal: *Absence of Falls  Description  Document Patsy Agudelo Fall Risk and appropriate interventions in the flowsheet.   Outcome: Progressing Towards Goal  Note: Fall Risk Interventions:                                Problem: Patient Education: Go to Patient Education Activity  Goal: Patient/Family Education  Outcome: Progressing Towards Goal

## 2020-01-06 PROCEDURE — 65220000003 HC RM SEMIPRIVATE PSYCH

## 2020-01-06 PROCEDURE — 74011250637 HC RX REV CODE- 250/637: Performed by: PSYCHIATRY & NEUROLOGY

## 2020-01-06 RX ADMIN — TRAZODONE HYDROCHLORIDE 50 MG: 50 TABLET ORAL at 21:46

## 2020-01-06 RX ADMIN — LURASIDONE HYDROCHLORIDE 20 MG: 20 TABLET, FILM COATED ORAL at 16:28

## 2020-01-06 RX ADMIN — FLUOXETINE 40 MG: 20 CAPSULE ORAL at 08:40

## 2020-01-06 RX ADMIN — CLONIDINE HYDROCHLORIDE 0.2 MG: 0.1 TABLET ORAL at 21:06

## 2020-01-06 NOTE — BH NOTES
Assumed care for the patient. Patient was out in the milieu, interacting well with the peers. Patient denied S.I/H. I/A/V/H. Patient stated that he had a good day and was anxious about going to home. Patient was meal and medicine compliant. No aggressive behavior noted. Patient received PRN PO Atarax for anxiety and PRN PO Trazodone for insomnia at 2217. Will continue to monitor. Patient slept for about 7 hours.

## 2020-01-06 NOTE — BH NOTES
0800 pt is visible on the unit. Denies si/hi/a/v barreto. Medication complaint. Complaint of anxiety. Ate breakfast.     1000 pt is visible on the unit. 1200 pt ate lunch. Pt is visible on the unit. 1400 pt is visible on the unit. 1600 pt is visible on the unit. 1800 ate dinner. Pt is visible on the unit.

## 2020-01-06 NOTE — PROGRESS NOTES
Problem: Depressed Mood (Adult/Pediatric)  Goal: *STG: Participates in treatment plan  Outcome: Progressing Towards Goal     Problem: Depressed Mood (Adult/Pediatric)  Goal: *STG: Remains safe in hospital  Outcome: Progressing Towards Goal     Problem: Suicide  Goal: *ABLE TO CONTROL ANXIETY RESPONSE  Outcome: Progressing Towards Goal

## 2020-01-06 NOTE — GROUP NOTE
LAYNE  GROUP DOCUMENTATION INDIVIDUAL                                                                          Group Therapy Note    Date: 1/6/2020    Group Start Time: 1000  Group End Time: 1100  Group Topic: Topic Group    [address] [facility] 3 ACUTE BEHAV [facility], [address] [facility] GROUP DOCUMENTATION GROUP    Group Therapy Note    Attendees: 4         Attendance: Attended    Patient's Goal:  To participate in mental health journey game    Interventions/techniques: Supported-choices in recovery    Follows Directions:  Followed directions    Interactions: Interacted appropriately    Mental Status: Calm and Flat    Behavior/appearance: Attentive, Cooperative and Needed prompting    Goals Achieved: Able to engage in interactions, Able to listen to others and Discussed coping      Additional Notes:      Rudy Antonio

## 2020-01-06 NOTE — GROUP NOTE
LAYNE  GROUP DOCUMENTATION INDIVIDUAL                                                                          Group Therapy Note    Date: 1/6/2020    Group Start Time: 1500  Group End Time: 1600  Group Topic: Recreational/Music Therapy    [facility] [place] [address] ACUTE BEHAV [facility]    [place], [address] [facility] GROUP DOCUMENTATION GROUP    Group Therapy Note    Attendees: 8         Attendance: Attended    Patient's Goal:  To concentrate on selected task    Interventions/techniques: Supported-crafts,games,music    Follows Directions:  Followed directions    Interactions: Interacted appropriately    Mental Status: Calm    Behavior/appearance: Attentive, Cooperative and Needed prompting    Goals Achieved: Able to engage in interactions and Able to listen to others-completed selected task      Additional Notes:      Joan Ac

## 2020-01-06 NOTE — PROGRESS NOTES
Problem: Suicide  Goal: *ABLE TO CONTROL ANXIETY RESPONSE  Outcome: Progressing Towards Goal     Problem: Post Traumatic Stress (Adult/Pediatric)  Goal: *ABLE TO CONTROL ANXIETY RESPONSE  Outcome: Progressing Towards Goal     Problem: Depressed Mood (Adult/Pediatric)  Goal: *STG: Participates in treatment plan  Outcome: Progressing Towards Goal  Goal: *STG: Participates in 1:1 therapy sessions  Outcome: Progressing Towards Goal  Goal: *STG: Verbalizes anger, guilt, and other feelings in a constructive manor  Outcome: Progressing Towards Goal  Goal: *STG: Attends activities and groups  Outcome: Progressing Towards Goal  Goal: *STG: Demonstrates reduction in symptoms and increase in insight into coping skills/future focused  Outcome: Progressing Towards Goal  Goal: *STG: Remains safe in hospital  Outcome: Progressing Towards Goal  Goal: *LTG: Returns to previous level of functioning and participates with after care plan  Outcome: Progressing Towards Goal  Goal: *LTG: Understands illness and can identify signs of relapse  Outcome: Progressing Towards Goal  Goal: Interventions  Outcome: Progressing Towards Goal     Problem: Patient Education: Go to Patient Education Activity  Goal: Patient/Family Education  Outcome: Progressing Towards Goal     Problem: Falls - Risk of  Goal: *Absence of Falls  Description  Document Clide Failing Fall Risk and appropriate interventions in the flowsheet.   Outcome: Progressing Towards Goal  Note: Fall Risk Interventions:                                Problem: Patient Education: Go to Patient Education Activity  Goal: Patient/Family Education  Outcome: Progressing Towards Goal
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
## 2020-01-07 VITALS
HEIGHT: 66 IN | SYSTOLIC BLOOD PRESSURE: 98 MMHG | OXYGEN SATURATION: 100 % | WEIGHT: 147 LBS | HEART RATE: 64 BPM | BODY MASS INDEX: 23.63 KG/M2 | TEMPERATURE: 97.8 F | DIASTOLIC BLOOD PRESSURE: 58 MMHG | RESPIRATION RATE: 18 BRPM

## 2020-01-07 PROCEDURE — 74011250637 HC RX REV CODE- 250/637: Performed by: PSYCHIATRY & NEUROLOGY

## 2020-01-07 RX ORDER — CLONIDINE HYDROCHLORIDE 0.2 MG/1
0.2 TABLET ORAL
Qty: 30 TAB | Refills: 0 | Status: SHIPPED | OUTPATIENT
Start: 2020-01-07 | End: 2021-02-11 | Stop reason: SDUPTHER

## 2020-01-07 RX ORDER — FLUOXETINE HYDROCHLORIDE 40 MG/1
40 CAPSULE ORAL DAILY
Qty: 30 CAP | Refills: 0 | Status: ON HOLD | OUTPATIENT
Start: 2020-01-08 | End: 2021-01-15

## 2020-01-07 RX ADMIN — FLUOXETINE 40 MG: 20 CAPSULE ORAL at 08:57

## 2020-01-07 RX ADMIN — LURASIDONE HYDROCHLORIDE 20 MG: 20 TABLET, FILM COATED ORAL at 14:56

## 2020-01-07 NOTE — DISCHARGE SUMMARY
PSYCHIATRIC DISCHARGE SUMMARY         IDENTIFICATION:    Patient Name  Pasha Tobin   Date of Birth 2000   [facility] [id number]   Medical Record Number  [id number]      Age  20 y.o. PCP Manuel Newsome MD   Admit date:  1/1/2020    Discharge date: 1/7/2020   Room Number  326/01  @ [facility]   Date of Service  1/7/2020            TYPE OF DISCHARGE: REGULAR               CONDITION AT DISCHARGE: fair and stable       PROVISIONAL & DISCHARGE DIAGNOSES:    Problem List  Date Reviewed: 6/26/2018          Codes Class    BMI 20.0-20.9, adult ICD-10-CM: Z68.20  ICD-9-CM: V85.1         Fibromyalgia ICD-10-CM: M79.7  ICD-9-CM: 729.1         Accidental overdose ICD-10-CM: T50.901A  ICD-9-CM: 977.9, E858.9         Anxiety disorder ICD-10-CM: F41.9  ICD-9-CM: 300.00     Overview Signed 7/7/2017  1:39 PM by Stefan Ramey MD     [facility], Dr. Jeffrey Kidd             * (Principal) MDD (major depressive disorder) ICD-10-CM: F32.9  ICD-9-CM: 296.20     Overview Signed 7/7/2017  1:39 PM by Stefan Ramey MD     [facility], Dr. Jeffrey Kidd             PTSD (post-traumatic stress disorder) ICD-10-CM: F29.20  ICD-9-CM: 309.81     Overview Addendum 8/18/2017  3:22 PM by Stefan Ramey MD     [facility], Dr. Jeffrey Kidd; Intensive-In-Home Services through [facility].              Refractive error ICD-10-CM: H52.7  ICD-9-CM: 367.9         Acne vulgaris ICD-10-CM: L70.0  ICD-9-CM: 706.1         Dental caries ICD-10-CM: K02.9  ICD-9-CM: 521.00     Overview Signed 7/7/2017  2:19 PM by MD Eda Barry             Constipation by delayed colonic transit ICD-10-CM: K59.01  ICD-9-CM: 564.01               Active Hospital Problems    PTSD (post-traumatic stress disorder)      *MDD (major depressive disorder)        DISCHARGE DIAGNOSIS:   Axis I:  SEE ABOVE  Axis II: SEE ABOVE  Axis III: SEE ABOVE  Axis IV:  lack of structure  Axis V:  20 on admission, 80 on discharge     CC & HISTORY OF PRESENT ILLNESS:  \"suicidal ideation\"    The patient, Conor Rios, is a 20 y.o.  WHITE Maninder Bream a past psychiatric history significant for PTSD and MDD, who presents at this time with complaints of (and/or evidence of) the following emotional symptoms: suicide attempt Overdose of: Antidepressants: Prozac, Quantity: 16 , Strength: 60 mg pills approximately 1 day ago.  Additional symptomatology include worsening anxiety and homicidal ideation.  The above symptoms have been present for 2+ weeks. These symptoms are of moderate to high severity. These symptoms are constant in nature.  The patient's condition has been precipitated by psychosocial stressors.  Patient's condition made worse by treatment noncompliance. UDS: negative; BAL=0.      The patient is a fair historian. The patient corroborates the above narrative. The patient contracts for safety on the unit and gives consent for the team to contact collateral. The patient is amenable to initiating treatment while on the unit. The patient reports previous trials of augmentation of MDD with antipsychotics, notably Abilify which caused Akathisia and Seroquel which caused sedation.     1/2/20 - patient tearful, depressed, continues to endorse SI. Patient's hiccups improved with gabapentin. Patient's tachycardia has improved, vitals appear more stable. Patient medication compliant, has been anxious, getting Atarax. Denies AVH.     1/3/20 - patient has been isolative, still depressed, medication compliant. Patient still with HI toward family, but no active plans to harm others. He slept 8.5 hours and is tolerating Latuda. Patient amenable to getting therapy as an outpatient for his ongoing depressive episode and to process stressors. Vitals WNL as of today.     1/4/20 - no acute overnight events. Patient has been visible, calm and cooperative, attending groups, states he is feeling better. Denies active thoughts of self harm. Patient reports some anxiety during group sessions but otherwise no symptoms of panic or distress. Patient slept 8 hours overnight, vitals are WNL. Discharge focused, plan to return home early in the week, grandmother will assist with safety planning.     1/5/20 - patient has been isolative, calm, medication compliant. Patient with no specific concerns, denies SI/HI/AVH. Patient eager for discharge, denies any side effects. Patient slept 6 hours overnight, no PRNs given.        SOCIAL HISTORY:    Social History     Socioeconomic History    Marital status: SINGLE     Spouse name: Not on file    Number of children: Not on file    Years of education: Not on file    Highest education level: Not on file   Occupational History    Not on file   Social Needs    Financial resource strain: Not on file    Food insecurity:     Worry: Not on file     Inability: Not on file    Transportation needs:     Medical: Not on file     Non-medical: Not on file   Tobacco Use    Smoking status: Never Smoker    Smokeless tobacco: Never Used   Substance and Sexual Activity    Alcohol use: No    Drug use: No    Sexual activity: Never   Lifestyle    Physical activity:     Days per week: Not on file     Minutes per session: Not on file    Stress: Not on file   Relationships    Social connections:     Talks on phone: Not on file     Gets together: Not on file     Attends Congregation service: Not on file     Active member of club or organization: Not on file     Attends meetings of clubs or organizations: Not on file     Relationship status: Not on file    Intimate partner violence:     Fear of current or ex partner: Not on file     Emotionally abused: Not on file     Physically abused: Not on file     Forced sexual activity: Not on file   Other Topics Concern    Not on file   Social History Narrative    ** Merged History Encounter **           FAMILY HISTORY:   Family History   Problem Relation Age of Onset    Diabetes Mother    Antonio Elevated Lipids Mother     Hypertension Mother    [place] Other Mother         fibromyalgia    Thyroid Disease Father     Diabetes Maternal Grandmother     Elevated Lipids Maternal Grandmother     Hypertension Maternal Grandmother     Other Maternal Grandmother 77        bowel obstruction    Diabetes Paternal Grandmother     Cancer Paternal Grandmother 61        lung             HOSPITALIZATION COURSE:    Taj Calle was admitted to the inpatient psychiatric unit [facility] for acute psychiatric stabilization in regards to symptomatology as described in the HPI above. The differential diagnosis at time of admission included: MDD vs adjustment disorder. While on the unit Taj Calle was involved in individual, group, occupational and milieu therapy. Psychiatric medications were adjusted during this hospitalization including Latuda and Prozac. Taj Calle demonstrated a slow, but progressive improvement in overall condition. Much of patient's initial presentation appeared to be related to situational stressors, effects of medication non-compliance and psychological factors. Please see individual progress notes for more specific details regarding patient's hospitalization course. Patient with request for discharge today. There are no grounds to seek a TDO. At time of discharge, Taj Calle is without significant problems of depression, psychosis, or mary grace. Patient free of suicidal and homicidal ideations (appears to be at very low risk of suicide or homicide) and reports many positive predictive factors in terms of not attempting suicide or homicide. Overall presentation at time of discharge is most consistent with the diagnosis of MDD. Patient has maximized benefit to be derived from acute inpatient psychiatric treatment. All members of the treatment team concur with each other in regards to plans for discharge today.  Patient and family are aware and in agreement with discharge and discharge plan.          LABS AND IMAGAING:    Labs Reviewed   METABOLIC PANEL, COMPREHENSIVE - Abnormal; Notable for the following components:       Result Value    Potassium 2.9 (*)     Anion gap 18 (*)     All other components within normal limits   SALICYLATE - Abnormal; Notable for the following components:    Salicylate level <5.3 (*)     All other components within normal limits   ACETAMINOPHEN - Abnormal; Notable for the following components:    Acetaminophen level <2 (*)     All other components within normal limits   METABOLIC PANEL, BASIC - Abnormal; Notable for the following components:    Glucose 122 (*)     Creatinine 0.69 (*)     Calcium 8.3 (*)     All other components within normal limits   URINALYSIS W/MICROSCOPIC - Abnormal; Notable for the following components:    Appearance TURBID (*)     Amorphous Crystals 2+ (*)     CA Oxalate crystals 1+ (*)     All other components within normal limits   CBC WITH AUTOMATED DIFF   MAGNESIUM   DRUG SCREEN, URINE   ETHYL ALCOHOL   LIPID PANEL   HEMOGLOBIN A1C WITH EAG     Lab Results   Component Value Date/Time    Phenobarbital <2.1 (L) 02/26/2016 09:41 PM     Admission on 01/01/2020   Component Date Value Ref Range Status    WBC 01/01/2020 9.1  4.1 - 11.1 K/uL Final    RBC 01/01/2020 5.44  4.10 - 5.70 M/uL Final    HGB 01/01/2020 16.0  12.1 - 17.0 g/dL Final    HCT 01/01/2020 45.0  36.6 - 50.3 % Final    MCV 01/01/2020 82.7  80.0 - 99.0 FL Final    MCH 01/01/2020 29.4  26.0 - 34.0 PG Final    MCHC 01/01/2020 35.6  30.0 - 36.5 g/dL Final    RDW 01/01/2020 12.6  11.5 - 14.5 % Final    PLATELET 82/36/6960 567  150 - 400 K/uL Final    MPV 01/01/2020 10.0  8.9 - 12.9 FL Final    NRBC 01/01/2020 0.0  0  WBC Final    ABSOLUTE NRBC 01/01/2020 0.00  0.00 - 0.01 K/uL Final    NEUTROPHILS 01/01/2020 71  32 - 75 % Final    LYMPHOCYTES 01/01/2020 19  12 - 49 % Final    MONOCYTES 01/01/2020 10  5 - 13 % Final    EOSINOPHILS 01/01/2020 0 0 - 7 % Final    BASOPHILS 01/01/2020 0  0 - 1 % Final    IMMATURE GRANULOCYTES 01/01/2020 0  0.0 - 0.5 % Final    ABS. NEUTROPHILS 01/01/2020 6.3  1.8 - 8.0 K/UL Final    ABS. LYMPHOCYTES 01/01/2020 1.7  0.8 - 3.5 K/UL Final    ABS. MONOCYTES 01/01/2020 0.9  0.0 - 1.0 K/UL Final    ABS. EOSINOPHILS 01/01/2020 0.0  0.0 - 0.4 K/UL Final    ABS. BASOPHILS 01/01/2020 0.0  0.0 - 0.1 K/UL Final    ABS. IMM. GRANS. 01/01/2020 0.0  0.00 - 0.04 K/UL Final    DF 01/01/2020 AUTOMATED    Final    Sodium 01/01/2020 144  136 - 145 mmol/L Final    Potassium 01/01/2020 2.9* 3.5 - 5.1 mmol/L Final    Chloride 01/01/2020 103  97 - 108 mmol/L Final    CO2 01/01/2020 23  21 - 32 mmol/L Final    Anion gap 01/01/2020 18* 5 - 15 mmol/L Final    Glucose 01/01/2020 94  65 - 100 mg/dL Final    BUN 01/01/2020 10  6 - 20 MG/DL Final    Creatinine 01/01/2020 0.71  0.70 - 1.30 MG/DL Final    BUN/Creatinine ratio 01/01/2020 14  12 - 20   Final    GFR est AA 01/01/2020 >60  >60 ml/min/1.73m2 Final    GFR est non-AA 01/01/2020 >60  >60 ml/min/1.73m2 Final    Calcium 01/01/2020 9.8  8.5 - 10.1 MG/DL Final    Bilirubin, total 01/01/2020 0.6  0.2 - 1.0 MG/DL Final    ALT (SGPT) 01/01/2020 41  12 - 78 U/L Final    AST (SGOT) 01/01/2020 23  15 - 37 U/L Final    Alk.  phosphatase 01/01/2020 47  45 - 117 U/L Final    Protein, total 01/01/2020 8.2  6.4 - 8.2 g/dL Final    Albumin 01/01/2020 4.9  3.5 - 5.0 g/dL Final    Globulin 01/01/2020 3.3  2.0 - 4.0 g/dL Final    A-G Ratio 01/01/2020 1.5  1.1 - 2.2   Final    Magnesium 01/01/2020 1.8  1.6 - 2.4 mg/dL Final    Salicylate level 49/89/7141 <1.7* 2.8 - 20.0 MG/DL Final    Ventricular Rate 01/01/2020 97  BPM Final    Atrial Rate 01/01/2020 97  BPM Final    P-R Interval 01/01/2020 118  ms Final    QRS Duration 01/01/2020 86  ms Final    Q-T Interval 01/01/2020 348  ms Final    QTC Calculation (Bezet) 01/01/2020 441  ms Final    Calculated P Axis 01/01/2020 62  degrees Final    Calculated R Axis 01/01/2020 20  degrees Final    Calculated T Axis 01/01/2020 31  degrees Final    Diagnosis 01/01/2020    Final                    Value:Normal sinus rhythm with sinus arrhythmia  Minimal voltage criteria for LVH, may be normal variant  Borderline ECG  When compared with ECG of 21-NOV-2017 10:47,  Nonspecific T wave abnormality has replaced inverted T waves in Inferior   leads  Confirmed by Sole Arora M.D., Merari Vidales ([id number]) on 1/2/2020 9:50:33 AM      Acetaminophen level 01/01/2020 <2* 10 - 30 ug/mL Final    AMPHETAMINES 01/01/2020 NEGATIVE   NEG   Final    BARBITURATES 01/01/2020 NEGATIVE   NEG   Final    BENZODIAZEPINES 01/01/2020 NEGATIVE   NEG   Final    COCAINE 01/01/2020 NEGATIVE   NEG   Final    METHADONE 01/01/2020 NEGATIVE   NEG   Final    OPIATES 01/01/2020 NEGATIVE   NEG   Final    PCP(PHENCYCLIDINE) 01/01/2020 NEGATIVE   NEG   Final    THC (TH-CANNABINOL) 01/01/2020 NEGATIVE   NEG   Final    Drug screen comment 01/01/2020 (NOTE)   Final    ALCOHOL(ETHYL),SERUM 01/01/2020 <10  <10 MG/DL Final    Sodium 01/01/2020 144  136 - 145 mmol/L Final    Potassium 01/01/2020 3.5  3.5 - 5.1 mmol/L Final    Chloride 01/01/2020 107  97 - 108 mmol/L Final    CO2 01/01/2020 24  21 - 32 mmol/L Final    Anion gap 01/01/2020 13  5 - 15 mmol/L Final    Glucose 01/01/2020 122* 65 - 100 mg/dL Final    BUN 01/01/2020 8  6 - 20 MG/DL Final    Creatinine 01/01/2020 0.69* 0.70 - 1.30 MG/DL Final    BUN/Creatinine ratio 01/01/2020 12  12 - 20   Final    GFR est AA 01/01/2020 >60  >60 ml/min/1.73m2 Final    GFR est non-AA 01/01/2020 >60  >60 ml/min/1.73m2 Final    Calcium 01/01/2020 8.3* 8.5 - 10.1 MG/DL Final    Color 01/01/2020 YELLOW/STRAW    Final    Appearance 01/01/2020 TURBID* CLEAR   Final    Specific gravity 01/01/2020 1.025  1.003 - 1.030   Final    pH (UA) 01/01/2020 7.5  5.0 - 8.0   Final    Protein 01/01/2020 NEGATIVE   NEG mg/dL Final    Glucose 01/01/2020 NEGATIVE NEG mg/dL Final    Ketone 01/01/2020 NEGATIVE   NEG mg/dL Final    Bilirubin 01/01/2020 NEGATIVE   NEG   Final    Blood 01/01/2020 NEGATIVE   NEG   Final    Urobilinogen 01/01/2020 1.0  0.2 - 1.0 EU/dL Final    Nitrites 01/01/2020 NEGATIVE   NEG   Final    Leukocyte Esterase 01/01/2020 NEGATIVE   NEG   Final    WBC 01/01/2020 0-4  0 - 4 /hpf Final    RBC 01/01/2020 0-5  0 - 5 /hpf Final    Epithelial cells 01/01/2020 FEW  FEW /lpf Final    Bacteria 01/01/2020 NEGATIVE   NEG /hpf Final    Amorphous Crystals 01/01/2020 2+* NEG Final    CA Oxalate crystals 01/01/2020 1+* NEG Final    LIPID PROFILE 01/03/2020        Final    Cholesterol, total 01/03/2020 151  <200 MG/DL Final    Triglyceride 01/03/2020 108  <150 MG/DL Final    HDL Cholesterol 01/03/2020 42  MG/DL Final    LDL, calculated 01/03/2020 87.4  0 - 100 MG/DL Final    VLDL, calculated 01/03/2020 21.6  MG/DL Final    CHOL/HDL Ratio 01/03/2020 3.6  0.0 - 5.0   Final    Hemoglobin A1c 01/03/2020 4.8  4.0 - 5.6 % Final    Est. average glucose 01/03/2020 91  mg/dL Final     No results found. DISPOSITION:    Home. Patient to f/u with psychiatric and psychotherapy appointments. Patient is to f/u with internist as directed. FOLLOW-UP CARE:    Activity as tolerated  Regular diet  Wound Care: none needed. Follow-up Information     Follow up With Specialties Details Why Contact Info    Dr. Dangelo An  On 1/8/2020 Medication management appointment on Wednesday, January 8th at [address] [facility], [address] [place] [address] Way  Phone: [phone]  Fax: [phone]    Korin Dalton  On 1/8/2020 Therapy appointment on Wednesday, January 8th at 1:00PM.  [facility], [address]  Phone: [phone]  Fax: [phone] [place] [address].    Schedule an appointment as soon as possible for a visit Dr. Simone Blandon will be calling you this week to set up an initial intake appointment for mental health skill building services. [address] [place] [address] [address], [address]  phone: [phone]  fax: [phone]                 PROGNOSIS:   Randall Francis ---- based on nature of patient's pathology/ies and treatment compliance issues. Prognosis is greatly dependent upon patient's ability to remain sober and to follow up with scheduled appointments as well as to comply with psychiatric medications as prescribed. DISCHARGE MEDICATIONS:     Informed consent given for the use of following psychotropic medications:  Current Discharge Medication List      START taking these medications    Details   FLUoxetine (PROZAC) 40 mg capsule Take 1 Cap by mouth daily. Indications: major depressive disorder  Qty: 30 Cap, Refills: 0      lurasidone (LATUDA) 20 mg tab tablet Take 1 Tab by mouth daily (with dinner). Indications: Bipolar Depression  Qty: 30 Tab, Refills: 0         CONTINUE these medications which have CHANGED    Details   cloNIDine HCl (CATAPRES) 0.2 mg tablet Take 1 Tab by mouth nightly. Indications: PTSD adjunct  Qty: 30 Tab, Refills: 0         STOP taking these medications       FLUoxetine 60 mg tab Comments:   Reason for Stopping:         DULoxetine (CYMBALTA) 20 mg capsule Comments:   Reason for Stopping:         multivitamin (ONE A DAY) tablet Comments:   Reason for Stopping:         QUEtiapine (SEROQUEL) 25 mg tablet Comments:   Reason for Stopping:         naproxen sodium (ALEVE) 220 mg tablet Comments:   Reason for Stopping:         Desvenlafaxine (PRISTIQ) 100 mg Tb24 Comments:   Reason for Stopping:                      A coordinated, multidisplinary treatment team round was conducted with Ernie Gonzales is done daily here at [facility]. This team consists of the nurse, psychiatric unit pharmacist,  and Kylie Stinson. I have spent greater than 35 minutes on discharge work.     Signed:  Willie Francis MD  1/7/2020

## 2020-01-07 NOTE — BH NOTES
GROUP THERAPY PROGRESS NOTE    Patient is participating in coping skills group. Group time: 50 minutes    Personal goal for participation: Learn coping skills to reduce negative emotions    Goal orientation: Personal    Group therapy participation: active    Therapeutic interventions reviewed and discussed: Group discussion on why being bored can be a problem and the consequences of boredom that patient have experienced. Patient discussed issues they have had with being bored and ways they have tried to combat boredom. This lead to discussion of coping skills for negative emotions and ways to feel better that each patient has tried or that they have heard of. Discussion of activities that help with boredom, challenges, potential solutions and plans. Impression of participation: Chente Richey shared that he enjoys playing his video games and interacting with people that way. He shared that being outdoors is also a way to help him feel better and distract himself. He shared that boredom causes him to think about things and make himself feel worse.     Carin Prince LPC [facility]

## 2020-01-07 NOTE — BH NOTES
GROUP THERAPY PROGRESS NOTE    Patient is participating in Process group. Group time: 50 minutes    Personal goal for participation: Process feelings related to discharge and communication issues with friends/family. Goal orientation: Personal    Group therapy participation: active    Therapeutic interventions reviewed and discussed: Group discussion was focused on discharge plans and anxiety related to this. Group members discussed what they planned to do once discharge and discharge plans. Discussion also related to support and communication issues that arise. Impression of participation: Daniel Jj shared that he is excited to go home. He is considering limiting his use of social media and his cellphone as this has been a trigger and having time without has been beneficial. He reports planning to go home and relax and eat a favorite meal. He wants to follow up with a counselor and reports talking to his twin is helpful when he feels overwhelmed.     Tania Mishra LPC LSATP [facility]

## 2020-01-07 NOTE — PROGRESS NOTES
Problem: Depressed Mood (Adult/Pediatric)  Goal: *STG: Participates in treatment plan  Outcome: Progressing Towards Goal  Goal: *STG: Attends activities and groups  Outcome: Progressing Towards Goal  Goal: *STG: Remains safe in hospital  Outcome: Progressing Towards Goal     Problem: Falls - Risk of  Goal: *Absence of Falls  Description  Document Phuong Higuera Fall Risk and appropriate interventions in the flowsheet.   Outcome: Progressing Towards Goal  Note: Fall Risk Interventions:

## 2020-01-07 NOTE — DISCHARGE INSTRUCTIONS
Patient Education   DISCHARGE SUMMARY    Braxton Larios  : 2000  MRN: [id number]    The patient John Brown exhibits the ability to control behavior in a less restrictive environment. Patient's level of functioning is improving. No assaultive/destructive behavior has been observed for the past 24 hours. No suicidal/homicidal threat or behavior has been observed for the past 24 hours. There is no evidence of serious medication side effects. Patient has not been in physical or protective restraints for at least the past 24 hours. If weapons involved, how are they secured? None involved. Is patient aware of and in agreement with discharge plan? Yes. Arrangements for medication:  Prescriptions given to patient. Copy of discharge instructions to provider?:  [facility] [address]    Arrangements for transportation home:  Family     Keep all follow up appointments as scheduled, continue to take prescribed medications per physician instructions. Mental health crisis number:  626 or your local mental health crisis line number at [phone]. If I feel I am at risk of hurting myself or others, I will call the crisis office and speak with a crisis worker who will assist me during my crisis. [address]  [phone]  [address] [facility] 87 [phone]  [facility] [address]  [phone]    Learning About Depression Screening  What is depression screening? Depression screening is a way to see if you have depression symptoms. It may be done by a doctor or counselor. This screening is often part of a routine checkup. That's because your mental health is just as important as your physical health. Depression is a medical illness that affects how you feel, think, and act. You may:  · Have less energy. · Lose interest in your daily activities.   · Feel sad and grouchy for a long time. Depression is very common. It affects men and women of all ages. Many things can trigger depression. Some people become depressed after they have a stroke or find out they have a major illness like cancer or heart disease. The death of a loved one, a breakup, or changes in the natural brain chemicals may lead to depression. It can run in families. Most experts believe that a combination of family history (a person's genes) and stressful life events can cause depression. What happens during screening? Your doctor may ask about your feelings, any changes in eating habits, your energy level, and your interest in your daily tasks. He or she may ask other questions, such as how well you are sleeping and if you can focus on the things you do. This may be an informal talk between the two of you. Or your doctor may ask you to fill out a quick form and then talk about your answers. Some diseases or changes in your body can cause symptoms that look like depression. So your doctor may do blood tests to help rule out other problems, such as hormone changes, a low thyroid level, or anemia. What happens after screening? If you have signs of depression, your doctor will talk to you about your options. Doctors usually treat depression with medicines or counseling. Often, combining the two works best. Many people don't get help because they think that they'll get over the depression on their own. But people with depression may not get better unless they get treatment. Many people feel embarrassed or ashamed about having depression. But it isn't a sign of personal weakness. It's not a character flaw. A person who is depressed is not \"crazy. \" Depression is caused by changes in the brain. A serious symptom of depression is thinking about death or suicide. If you or someone you care about talks about this or about feeling hopeless, get help right away. It's important to know that depression can be treated.  The first step toward feeling better is often just seeing that the problem exists. Where can you learn more? Go to http://demetrius-semaj.info/. Enter T185 in the search box to learn more about \"Learning About Depression Screening. \"  Current as of: May 28, 2019  Content Version: 12.2  © 9543-6613 [employer]. Care instructions adapted under license by [employer] (which disclaims liability or warranty for this information). If you have questions about a medical condition or this instruction, always ask your healthcare professional. [address] any warranty or liability for your use of this information.

## 2020-01-07 NOTE — BH NOTES
Behavioral Health Interdisciplinary Rounds     Patient Name: Radha Diaz  Age: 20 y.o.   Room/Bed:  [facility]/  Primary Diagnosis: MDD (major depressive disorder)   Admission Status: Voluntary     Readmission within 30 days: no  Power of  in place: no  Patient requires a blocked bed: no          Reason for blocked bed: n/a    Sleep hours: 9       Participation in Care/Groups:  yes  Medication Compliant?: Yes  PRNS (last 24 hours): Sleep Aid    Restraints (last 24 hours):  no  Substance Abuse:  no    24 hour chart check complete: yes     Patient goal(s) for today:   Treatment team focus/goals:   Progress note:     LOS:  6  Expected LOS:     Financial concerns/prescription coverage:  no  Family contact:       Family requesting physician contact today:  no  Discharge plan:   Access to weapons: no        Outpatient provider(s):   Patient's preferred phone number for follow up call:     Participating treatment team members: Radha Diaz (assigned SW),

## 2020-01-07 NOTE — BH NOTES
Behavioral Health Transition Record to Provider    Patient Name: Taj Calle  YOB: 2000  Medical Record Number: [id number]  Date of Admission: 1/1/2020  Date of Discharge: 1/8/2020    Attending Provider: Amrit Sykes, *  Discharging Provider: Eliseo Irizarry MD  To contact this individual call [phone] and ask the  to page. If unavailable, ask to be transferred to [address] [place] [address] Provider on call. [facility] Provider will be available on call 24/7 and during holidays. Primary Care Provider: Kirk Chambers MD    Allergies   Allergen Reactions    Codeine Rash    Augmentin [Amoxicillin-Pot Clavulanate] Unknown (comments)    Codeine Hives    Other Medication Rash     Allergic to peanut butter    Zoloft [Sertraline] Unknown (comments)       Reason for Admission: Pt overdosed on 16 pills of Prozac in a suicide attempt. Pt disclosed HI towards his estranged parents and stressed over his twin brother possibly losing his housing. Pt reports difficulty sleeping and regular use of marijuana. Admission Diagnosis: Major depression [F32.9]    * No surgery found *    Results for orders placed or performed during the hospital encounter of 01/01/20   CBC WITH AUTOMATED DIFF   Result Value Ref Range    WBC 9.1 4.1 - 11.1 K/uL    RBC 5.44 4.10 - 5.70 M/uL    HGB 16.0 12.1 - 17.0 g/dL    HCT 45.0 36.6 - 50.3 %    MCV 82.7 80.0 - 99.0 FL    MCH 29.4 26.0 - 34.0 PG    MCHC 35.6 30.0 - 36.5 g/dL    RDW 12.6 11.5 - 14.5 %    PLATELET 353 229 - 757 K/uL    MPV 10.0 8.9 - 12.9 FL    NRBC 0.0 0  WBC    ABSOLUTE NRBC 0.00 0.00 - 0.01 K/uL    NEUTROPHILS 71 32 - 75 %    LYMPHOCYTES 19 12 - 49 %    MONOCYTES 10 5 - 13 %    EOSINOPHILS 0 0 - 7 %    BASOPHILS 0 0 - 1 %    IMMATURE GRANULOCYTES 0 0.0 - 0.5 %    ABS. NEUTROPHILS 6.3 1.8 - 8.0 K/UL    ABS. LYMPHOCYTES 1.7 0.8 - 3.5 K/UL    ABS. MONOCYTES 0.9 0.0 - 1.0 K/UL    ABS. EOSINOPHILS 0.0 0.0 - 0.4 K/UL    ABS. BASOPHILS 0.0 0.0 - 0.1 K/UL    ABS. IMM. GRANS. 0.0 0.00 - 0.04 K/UL    DF AUTOMATED     METABOLIC PANEL, COMPREHENSIVE   Result Value Ref Range    Sodium 144 136 - 145 mmol/L    Potassium 2.9 (L) 3.5 - 5.1 mmol/L    Chloride 103 97 - 108 mmol/L    CO2 23 21 - 32 mmol/L    Anion gap 18 (H) 5 - 15 mmol/L    Glucose 94 65 - 100 mg/dL    BUN 10 6 - 20 MG/DL    Creatinine 0.71 0.70 - 1.30 MG/DL    BUN/Creatinine ratio 14 12 - 20      GFR est AA >60 >60 ml/min/1.73m2    GFR est non-AA >60 >60 ml/min/1.73m2    Calcium 9.8 8.5 - 10.1 MG/DL    Bilirubin, total 0.6 0.2 - 1.0 MG/DL    ALT (SGPT) 41 12 - 78 U/L    AST (SGOT) 23 15 - 37 U/L    Alk.  phosphatase 47 45 - 117 U/L    Protein, total 8.2 6.4 - 8.2 g/dL    Albumin 4.9 3.5 - 5.0 g/dL    Globulin 3.3 2.0 - 4.0 g/dL    A-G Ratio 1.5 1.1 - 2.2     MAGNESIUM   Result Value Ref Range    Magnesium 1.8 1.6 - 2.4 mg/dL   SALICYLATE   Result Value Ref Range    Salicylate level <8.8 (L) 2.8 - 20.0 MG/DL   ACETAMINOPHEN   Result Value Ref Range    Acetaminophen level <2 (L) 10 - 30 ug/mL   DRUG SCREEN, URINE   Result Value Ref Range    AMPHETAMINES NEGATIVE  NEG      BARBITURATES NEGATIVE  NEG      BENZODIAZEPINES NEGATIVE  NEG      COCAINE NEGATIVE  NEG      METHADONE NEGATIVE  NEG      OPIATES NEGATIVE  NEG      PCP(PHENCYCLIDINE) NEGATIVE  NEG      THC (TH-CANNABINOL) NEGATIVE  NEG      Drug screen comment (NOTE)    ETHYL ALCOHOL   Result Value Ref Range    ALCOHOL(ETHYL),SERUM <10 <97 MG/DL   METABOLIC PANEL, BASIC   Result Value Ref Range    Sodium 144 136 - 145 mmol/L    Potassium 3.5 3.5 - 5.1 mmol/L    Chloride 107 97 - 108 mmol/L    CO2 24 21 - 32 mmol/L    Anion gap 13 5 - 15 mmol/L    Glucose 122 (H) 65 - 100 mg/dL    BUN 8 6 - 20 MG/DL    Creatinine 0.69 (L) 0.70 - 1.30 MG/DL    BUN/Creatinine ratio 12 12 - 20      GFR est AA >60 >60 ml/min/1.73m2    GFR est non-AA >60 >60 ml/min/1.73m2    Calcium 8.3 (L) 8.5 - 10.1 MG/DL   URINALYSIS W/MICROSCOPIC   Result Value Ref Range    Color YELLOW/STRAW      Appearance TURBID (A) CLEAR      Specific gravity 1.025 1.003 - 1.030      pH (UA) 7.5 5.0 - 8.0      Protein NEGATIVE  NEG mg/dL    Glucose NEGATIVE  NEG mg/dL    Ketone NEGATIVE  NEG mg/dL    Bilirubin NEGATIVE  NEG      Blood NEGATIVE  NEG      Urobilinogen 1.0 0.2 - 1.0 EU/dL    Nitrites NEGATIVE  NEG      Leukocyte Esterase NEGATIVE  NEG      WBC 0-4 0 - 4 /hpf    RBC 0-5 0 - 5 /hpf    Epithelial cells FEW FEW /lpf    Bacteria NEGATIVE  NEG /hpf    Amorphous Crystals 2+ (A) NEG    CA Oxalate crystals 1+ (A) NEG   LIPID PANEL   Result Value Ref Range    LIPID PROFILE          Cholesterol, total 151 <200 MG/DL    Triglyceride 108 <150 MG/DL    HDL Cholesterol 42 MG/DL    LDL, calculated 87.4 0 - 100 MG/DL    VLDL, calculated 21.6 MG/DL    CHOL/HDL Ratio 3.6 0.0 - 5.0     HEMOGLOBIN A1C WITH EAG   Result Value Ref Range    Hemoglobin A1c 4.8 4.0 - 5.6 %    Est. average glucose 91 mg/dL   EKG, 12 LEAD, INITIAL   Result Value Ref Range    Ventricular Rate 97 BPM    Atrial Rate 97 BPM    P-R Interval 118 ms    QRS Duration 86 ms    Q-T Interval 348 ms    QTC Calculation (Bezet) 441 ms    Calculated P Axis 62 degrees    Calculated R Axis 20 degrees    Calculated T Axis 31 degrees    Diagnosis       Normal sinus rhythm with sinus arrhythmia  Minimal voltage criteria for LVH, may be normal variant  Borderline ECG  When compared with ECG of 21-NOV-2017 10:47,  Nonspecific T wave abnormality has replaced inverted T waves in Inferior   leads  Confirmed by Lupillo Meyer M.D., Claudia Sanchez ([id number]) on 1/2/2020 9:50:33 AM         Immunizations administered during this encounter:   Immunization History   Administered Date(s) Administered    DTaP 2000, 02/17/2000, 05/16/2000, 02/23/2001, 09/09/2004    HPV (9-valent) 06/26/2018    Hep B Vaccine 2000, 05/16/2000, 08/15/2000    Hib 2000, 02/17/2000, 05/16/2000, 02/23/2001    Influenza Vaccine 11/07/2000, 12/18/2000, 11/19/2003, 09/24/2012    MMR 02/23/2001, 09/09/2004    Pneumococcal Vaccine (Unspecified Type) 05/16/2000, 08/15/2000, 11/07/2000    Poliovirus vaccine 2000, 02/17/2000, 05/16/2000, 09/09/2004    Td 07/14/2011    Varicella Virus Vaccine 11/07/2000       Screening for Metabolic Disorders for Patients on Antipsychotic Medications  (Data obtained from the EMR)    Estimated Body Mass Index  Estimated body mass index is 23.73 kg/m² as calculated from the following:    Height as of this encounter: 5' 6\" (1.676 m). Weight as of this encounter: 66.7 kg (147 lb). Vital Signs/Blood Pressure  Visit Vitals  BP 98/58 (BP 1 Location: Left arm, BP Patient Position: At rest)   Pulse 64   Temp 97.8 °F (36.6 °C)   Resp 18   Ht 5' 6\" (1.676 m)   Wt 66.7 kg (147 lb)   SpO2 100%   BMI 23.73 kg/m²       Blood Glucose/Hemoglobin A1c  Lab Results   Component Value Date/Time    Glucose 122 (H) 01/01/2020 05:29 AM    Glucose (POC) 98 12/26/2014 10:22 PM       Lab Results   Component Value Date/Time    Hemoglobin A1c 4.8 01/03/2020 05:06 AM        Lipid Panel  Lab Results   Component Value Date/Time    Cholesterol, total 151 01/03/2020 05:06 AM    HDL Cholesterol 42 01/03/2020 05:06 AM    LDL, calculated 87.4 01/03/2020 05:06 AM    Triglyceride 108 01/03/2020 05:06 AM    CHOL/HDL Ratio 3.6 01/03/2020 05:06 AM        Discharge Diagnosis: Please refer to physician's discharge summary. Discharge Plan: The patient Taj Calle exhibits the ability to control behavior in a less restrictive environment. Patient's level of functioning is improving. No assaultive/destructive behavior has been observed for the past 24 hours. No suicidal/homicidal threat or behavior has been observed for the past 24 hours. There is no evidence of serious medication side effects. Patient has not been in physical or protective restraints for at least the past 24 hours. If weapons involved, how are they secured? None involved.      Is patient aware of and in agreement with discharge plan? Yes. Arrangements for medication:  Prescriptions given to patient. Copy of discharge instructions to provider?:  [facility] [address]    Arrangements for transportation home:  Family     Keep all follow up appointments as scheduled, continue to take prescribed medications per physician instructions. Mental health crisis number:  069 or your local mental health crisis line number at [phone]. Discharge Medication List and Instructions:   Current Discharge Medication List      START taking these medications    Details   FLUoxetine (PROZAC) 40 mg capsule Take 1 Cap by mouth daily. Indications: major depressive disorder  Qty: 30 Cap, Refills: 0      lurasidone (LATUDA) 20 mg tab tablet Take 1 Tab by mouth daily (with dinner). Indications: Bipolar Depression  Qty: 30 Tab, Refills: 0         CONTINUE these medications which have CHANGED    Details   cloNIDine HCl (CATAPRES) 0.2 mg tablet Take 1 Tab by mouth nightly. Indications: PTSD adjunct  Qty: 30 Tab, Refills: 0         STOP taking these medications       FLUoxetine 60 mg tab Comments:   Reason for Stopping:         DULoxetine (CYMBALTA) 20 mg capsule Comments:   Reason for Stopping:         multivitamin (ONE A DAY) tablet Comments:   Reason for Stopping:         QUEtiapine (SEROQUEL) 25 mg tablet Comments:   Reason for Stopping:         naproxen sodium (ALEVE) 220 mg tablet Comments:   Reason for Stopping:         Desvenlafaxine (PRISTIQ) 100 mg Tb24 Comments:   Reason for Stopping:               Unresulted Labs (24h ago, onward)    None        To obtain results of studies pending at discharge, please contact N/A.      Follow-up Information     Follow up With Specialties Details Why Contact Info    Dr. Gui Snow  On 1/8/2020 Medication management appointment on Wednesday, January 8th at [address] [facility], [facility]  Phone: [phone]  Fax: [phone]    Victor Manuel Bernal  On 1/8/2020 Therapy appointment on Wednesday, January 8th at 1:00PM.  [facility], [address]  Phone: [phone]  Fax: [phone] [address] [place] [address]. Schedule an appointment as soon as possible for a visit Dr. Laxmi Moran will be calling you this week to set up an initial intake appointment for mental health skill building services. [address] [place] [address] [address] 40 [facility]  phone: [phone]  fax: [phone]          Advanced Directive:   Does the patient have an appointed surrogate decision maker? Unknown   Does the patient have a Medical Advance Directive? Unknown   Does the patient have a Psychiatric Advance Directive? Unknown   If the patient does not have a surrogate or Medical Advance Directive AND Psychiatric Advance Directive, the patient was offered information on these advance directives. Unknown     Patient Instructions: Please continue all medications until otherwise directed by physician. Tobacco Cessation Discharge Plan:   Is the patient a smoker and needs referral for smoking cessation? No  Patient referred to the following for smoking cessation with an appointment? No   Patient was offered medication to assist with smoking cessation at discharge? No  Was education for smoking cessation added to the discharge instructions? No     Alcohol/Substance Abuse Discharge Plan:   Does the patient have a history of substance/alcohol abuse and requires a referral for treatment? Yes  Patient referred to the following for substance/alcohol abuse treatment with an appointment? Yes  Patient was offered medication to assist with alcohol cessation at discharge? No  Was education for substance/alcohol abuse added to discharge instructions? Yes     Patient discharged to Home; provided to the patient/caregiver either in hard copy or electronically. Continuing care paperwork was faxed to community mental health providers.

## 2020-01-07 NOTE — BH NOTES
Report received from [address];  At start of shift patient in room appears to be sleeping;    Patient came out of his room and came into the dayroom very pleasant. Patient watching TV eating snack and talking with other patients.  Patient denies [address]    Patient compliant with medication provided PRN Trazodone 2146    Hourly Rounds completed, patient slept 9hrs

## 2020-01-07 NOTE — GROUP NOTE
LAYNE  GROUP DOCUMENTATION INDIVIDUAL                                                                          Group Therapy Note    Date: 1/7/2020    Group Start Time: 1000  Group End Time: 1100  Group Topic: Topic Group    [address] [place] [address] [address] ACUTE BE[address] [facility], [place] [address] [facility] GROUP DOCUMENTATION GROUP    Group Therapy Note    Attendees: 4         Attendance: Attended    Patient's Goal:  To participate in chair exercise routine    Interventions/techniques: Supported-benefits of exercise    Follows Directions:  Followed directions    Interactions: Interacted appropriately    Mental Status: Calm    Behavior/appearance: Attentive and Cooperative    Goals Achieved: Able to engage in interactions, Able to listen to others and Discussed coping      Additional Notes:      Rudy Antonio

## 2020-01-07 NOTE — BH NOTES
Report received from Hans Helm LPN. Patient josefa in room sleeping. 1215  Patient in dayroom eating lunch, socializing with peers. Patient denies SI/HI/AVH at this time. 1458  Patient attending group. Patient given [place] before discharge. 1515  Patient discharged to home with grandmother. Patient given all discharge instructions, including prescription information and follow up appts. Patient verbalized understanding.

## 2020-02-21 ENCOUNTER — OFFICE VISIT (OUTPATIENT)
Dept: FAMILY MEDICINE CLINIC | Age: 20
End: 2020-02-21

## 2020-02-21 VITALS
BODY MASS INDEX: 24.43 KG/M2 | TEMPERATURE: 98 F | HEART RATE: 58 BPM | HEIGHT: 66 IN | SYSTOLIC BLOOD PRESSURE: 106 MMHG | WEIGHT: 152 LBS | OXYGEN SATURATION: 100 % | DIASTOLIC BLOOD PRESSURE: 59 MMHG | RESPIRATION RATE: 16 BRPM

## 2020-02-21 DIAGNOSIS — Z79.899 ENCOUNTER FOR LONG-TERM (CURRENT) USE OF MEDICATIONS: ICD-10-CM

## 2020-02-21 DIAGNOSIS — F52.32 DELAYED EJACULATION: ICD-10-CM

## 2020-02-21 DIAGNOSIS — Z72.52 HIGH RISK HOMOSEXUAL BEHAVIOR: ICD-10-CM

## 2020-02-21 DIAGNOSIS — F33.1 MODERATE EPISODE OF RECURRENT MAJOR DEPRESSIVE DISORDER (HCC): Primary | ICD-10-CM

## 2020-02-21 DIAGNOSIS — F41.8 PERFORMANCE ANXIETY: ICD-10-CM

## 2020-02-21 DIAGNOSIS — F43.10 PTSD (POST-TRAUMATIC STRESS DISORDER): ICD-10-CM

## 2020-02-21 DIAGNOSIS — Z11.3 SCREEN FOR STD (SEXUALLY TRANSMITTED DISEASE): ICD-10-CM

## 2020-02-21 RX ORDER — BUSPIRONE HYDROCHLORIDE 7.5 MG/1
TABLET ORAL
Status: ON HOLD | COMMUNITY
Start: 2020-01-30 | End: 2021-01-15

## 2020-02-21 NOTE — PROGRESS NOTES
Chief Complaint   Patient presents with    Blood Pressure Check     BP running high       1. Have you been to the ER, urgent care clinic since your last visit? Hospitalized since your last visit? yes    2. Have you seen or consulted any other health care providers outside of the [address] [place] [address] since your last visit? Include any pap smears or colon screening.  yes

## 2020-02-21 NOTE — PROGRESS NOTES
Sumeet Nicholas is a 20 y.o. male    I last saw this pt 06/2018. He's been noncompliant to f/u and to meds. Also was seeing psych and was noncompliant to psych meds. has a past medical history of Anxiety disorder (7/7/2017), Asthma, BMI 20.0-20.9, adult (6/28/2018), Brain injury [facility]), Constipation by delayed colonic transit (4/23/2015), Dental caries (7/7/2017), Fibromyalgia (6/26/2018), MDD (major depressive disorder) (7/7/2017), Premature Birth, Premature infant, Psychiatric disorder, and PTSD (post-traumatic stress disorder) (7/7/2017). He's here today for a check up. Psych admission note 01/2020 reviewed. Psych, currently seeing [address]  01/2020 lipid and A1C normal. CBC normal. CMP last was normal but had hyokalemia     Needs tsh    Delayed ejaculation, homosexual.  Can masturbate fine, but have prob with \"finish with a partner\". Performance anxiety    Homosexual, 4 partners total - denies hx of STI. Check for HIV and chlam/patricia/tric      Reviewed: active problem list, medication list, allergies, notes from last encounter, lab results    A comprehensive review of systems was negative except for that written in the HPI. Allergies   Allergen Reactions    Codeine Rash    Augmentin [Amoxicillin-Pot Clavulanate] Unknown (comments)    Codeine Hives    Other Medication Rash     Allergic to peanut butter    Zoloft [Sertraline] Unknown (comments)     Current Outpatient Medications on File Prior to Visit   Medication Sig Dispense Refill    busPIRone (BUSPAR) 7.5 mg tablet TAKE ONE TABLET BY MOUTH TWICE A DAY      FLUoxetine (PROZAC) 40 mg capsule Take 1 Cap by mouth daily. Indications: major depressive disorder 30 Cap 0    cloNIDine HCl (CATAPRES) 0.2 mg tablet Take 1 Tab by mouth nightly. Indications: PTSD adjunct 30 Tab 0     No current facility-administered medications on file prior to visit.       Patient Active Problem List   Diagnosis Code    Constipation by delayed colonic transit K59.01    Anxiety disorder F41.9    MDD (major depressive disorder) F32.9    PTSD (post-traumatic stress disorder) F43.10    Refractive error H52.7    Acne vulgaris L70.0    Dental caries K02.9    Accidental overdose T50.901A    Fibromyalgia M79.7    BMI 20.0-20.9, adult Z68.20       Visit Vitals  /59   Pulse (!) 58   Temp 98 °F (36.7 °C) (Oral)   Resp 16   Ht 5' 6\" (1.676 m)   Wt 152 lb (68.9 kg)   SpO2 100%   BMI 24.53 kg/m²     General appearance: alert, cooperative, no distress, appears stated age  Neurologic: Alert and oriented X 3, normal strength and tone, symmetric. Normal without focal findings. Cranial nerves 2-12 intact. Normal coordination and gait. Mental status: Alert, oriented, thought content appropriate, affect: stable, mood-congruent. Head: Normocephalic, without obvious abnormality, atraumatic  Eyes: conjunctivae/corneas clear. PERRL, EOM's intact. Neck: supple, symmetrical, trachea midline, no JVD  Lungs: clear to auscultation bilaterally  Heart: regular rate and rhythm, S1, S2 normal, no murmur, click, rub or gallop  Abdomen: soft, non-tender. Extremities: extremities normal, atraumatic, no cyanosis or edema      Assessment/Plans:    Diagnoses and all orders for this visit:    1. Moderate episode of recurrent major depressive disorder (HCC)  -     TSH 3RD GENERATION  -     METABOLIC PANEL, BASIC    2. PTSD (post-traumatic stress disorder)  -     TSH 3RD GENERATION  -     METABOLIC PANEL, BASIC    3. Screen for STD (sexually transmitted disease)  -     HIV 1/2 AG/AB, 4TH GENERATION,W RFLX CONFIRM  -     CT+NG+M GENITALIUM BY RONEY, UR    4. High risk homosexual behavior  -     CT+NG+M GENITALIUM BY RONEY, UR    5. Delayed ejaculation    6. Performance anxiety    7. Encounter for long-term (current) use of medications  -     TSH 3RD GENERATION  -     METABOLIC PANEL, BASIC      Discussed plans, risk/benefits of treatments/observations.    Through the use of shared decision making, above plans were agreed upon. Medication compliance advised. Patient verbalized understanding. Follow-up and Dispositions    · Return in about 1 year (around 2/21/2021) for annual exam, sooner as needed.          Tran Bruner MD  2/21/2020

## 2020-02-22 LAB
BUN SERPL-MCNC: 9 MG/DL (ref 6–20)
BUN/CREAT SERPL: 13 (ref 9–20)
CALCIUM SERPL-MCNC: 10.3 MG/DL (ref 8.7–10.2)
CHLORIDE SERPL-SCNC: 98 MMOL/L (ref 96–106)
CO2 SERPL-SCNC: 25 MMOL/L (ref 20–29)
CREAT SERPL-MCNC: 0.72 MG/DL (ref 0.76–1.27)
GLUCOSE SERPL-MCNC: 80 MG/DL (ref 65–99)
HIV 1+2 AB+HIV1 P24 AG SERPL QL IA: NON REACTIVE
POTASSIUM SERPL-SCNC: 4 MMOL/L (ref 3.5–5.2)
SODIUM SERPL-SCNC: 141 MMOL/L (ref 134–144)
TSH SERPL DL<=0.005 MIU/L-ACNC: 1.47 UIU/ML (ref 0.45–4.5)

## 2020-02-25 LAB
C TRACH RRNA UR QL NAA+PROBE: NEGATIVE
MYCOPLASMA GENITALIUM NAA, 180024: NEGATIVE
N GONORRHOEA RRNA UR QL NAA+PROBE: NEGATIVE

## 2020-06-17 ENCOUNTER — HOSPITAL ENCOUNTER (EMERGENCY)
Age: 20
Discharge: OTHER INSTITUTION | End: 2020-06-18
Attending: EMERGENCY MEDICINE | Admitting: EMERGENCY MEDICINE
Payer: MEDICAID

## 2020-06-17 DIAGNOSIS — R45.851 SUICIDAL IDEATIONS: Primary | ICD-10-CM

## 2020-06-17 LAB
AMPHET UR QL SCN: NEGATIVE
APPEARANCE UR: CLEAR
BACTERIA URNS QL MICRO: NEGATIVE /HPF
BARBITURATES UR QL SCN: NEGATIVE
BENZODIAZ UR QL: NEGATIVE
BILIRUB UR QL: NEGATIVE
CANNABINOIDS UR QL SCN: NEGATIVE
COCAINE UR QL SCN: NEGATIVE
COLOR UR: ABNORMAL
DRUG SCRN COMMENT,DRGCM: NORMAL
EPITH CASTS URNS QL MICRO: ABNORMAL /LPF
GLUCOSE UR STRIP.AUTO-MCNC: NEGATIVE MG/DL
HGB UR QL STRIP: ABNORMAL
HYALINE CASTS URNS QL MICRO: ABNORMAL /LPF (ref 0–5)
KETONES UR QL STRIP.AUTO: NEGATIVE MG/DL
LEUKOCYTE ESTERASE UR QL STRIP.AUTO: NEGATIVE
METHADONE UR QL: NEGATIVE
NITRITE UR QL STRIP.AUTO: NEGATIVE
OPIATES UR QL: NEGATIVE
PCP UR QL: NEGATIVE
PH UR STRIP: 8 [PH] (ref 5–8)
PROT UR STRIP-MCNC: NEGATIVE MG/DL
RBC #/AREA URNS HPF: ABNORMAL /HPF (ref 0–5)
SP GR UR REFRACTOMETRY: 1.01 (ref 1–1.03)
UA: UC IF INDICATED,UAUC: ABNORMAL
UROBILINOGEN UR QL STRIP.AUTO: 1 EU/DL (ref 0.2–1)
WBC URNS QL MICRO: ABNORMAL /HPF (ref 0–4)

## 2020-06-17 PROCEDURE — 99284 EMERGENCY DEPT VISIT MOD MDM: CPT

## 2020-06-17 PROCEDURE — 81001 URINALYSIS AUTO W/SCOPE: CPT

## 2020-06-17 PROCEDURE — 90791 PSYCH DIAGNOSTIC EVALUATION: CPT

## 2020-06-17 PROCEDURE — 80307 DRUG TEST PRSMV CHEM ANLYZR: CPT

## 2020-06-18 ENCOUNTER — HOSPITAL ENCOUNTER (INPATIENT)
Age: 20
LOS: 5 days | Discharge: HOME | DRG: 751 | End: 2020-06-23
Attending: PSYCHIATRY & NEUROLOGY | Admitting: PSYCHIATRY & NEUROLOGY
Payer: MEDICAID

## 2020-06-18 VITALS
BODY MASS INDEX: 25.02 KG/M2 | RESPIRATION RATE: 16 BRPM | OXYGEN SATURATION: 100 % | SYSTOLIC BLOOD PRESSURE: 122 MMHG | WEIGHT: 159.39 LBS | HEART RATE: 75 BPM | TEMPERATURE: 98.4 F | HEIGHT: 67 IN | DIASTOLIC BLOOD PRESSURE: 77 MMHG

## 2020-06-18 PROCEDURE — 74011250637 HC RX REV CODE- 250/637: Performed by: NURSE PRACTITIONER

## 2020-06-18 PROCEDURE — 65220000003 HC RM SEMIPRIVATE PSYCH

## 2020-06-18 RX ORDER — ADHESIVE BANDAGE
30 BANDAGE TOPICAL DAILY PRN
Status: DISCONTINUED | OUTPATIENT
Start: 2020-06-18 | End: 2020-06-23 | Stop reason: HOSPADM

## 2020-06-18 RX ORDER — BENZTROPINE MESYLATE 1 MG/1
1 TABLET ORAL
Status: DISCONTINUED | OUTPATIENT
Start: 2020-06-18 | End: 2020-06-23 | Stop reason: HOSPADM

## 2020-06-18 RX ORDER — FLUOXETINE HYDROCHLORIDE 20 MG/1
40 CAPSULE ORAL DAILY
Status: DISCONTINUED | OUTPATIENT
Start: 2020-06-18 | End: 2020-06-23 | Stop reason: HOSPADM

## 2020-06-18 RX ORDER — OLANZAPINE 5 MG/1
5 TABLET ORAL
Status: DISCONTINUED | OUTPATIENT
Start: 2020-06-18 | End: 2020-06-23 | Stop reason: HOSPADM

## 2020-06-18 RX ORDER — BUSPIRONE HYDROCHLORIDE 5 MG/1
7.5 TABLET ORAL 2 TIMES DAILY
Status: DISCONTINUED | OUTPATIENT
Start: 2020-06-18 | End: 2020-06-23 | Stop reason: HOSPADM

## 2020-06-18 RX ORDER — HYDROXYZINE 25 MG/1
50 TABLET, FILM COATED ORAL
Status: DISCONTINUED | OUTPATIENT
Start: 2020-06-18 | End: 2020-06-23 | Stop reason: HOSPADM

## 2020-06-18 RX ORDER — HALOPERIDOL 5 MG/ML
5 INJECTION INTRAMUSCULAR
Status: DISCONTINUED | OUTPATIENT
Start: 2020-06-18 | End: 2020-06-23 | Stop reason: HOSPADM

## 2020-06-18 RX ORDER — LORAZEPAM 2 MG/ML
1 INJECTION INTRAMUSCULAR
Status: DISCONTINUED | OUTPATIENT
Start: 2020-06-18 | End: 2020-06-23 | Stop reason: HOSPADM

## 2020-06-18 RX ORDER — CLONIDINE HYDROCHLORIDE 0.1 MG/1
0.2 TABLET ORAL
Status: DISCONTINUED | OUTPATIENT
Start: 2020-06-18 | End: 2020-06-23 | Stop reason: HOSPADM

## 2020-06-18 RX ORDER — ACETAMINOPHEN 325 MG/1
650 TABLET ORAL
Status: DISCONTINUED | OUTPATIENT
Start: 2020-06-18 | End: 2020-06-23 | Stop reason: HOSPADM

## 2020-06-18 RX ORDER — TRAZODONE HYDROCHLORIDE 50 MG/1
50 TABLET ORAL
Status: DISCONTINUED | OUTPATIENT
Start: 2020-06-18 | End: 2020-06-23 | Stop reason: HOSPADM

## 2020-06-18 RX ORDER — DIPHENHYDRAMINE HYDROCHLORIDE 50 MG/ML
50 INJECTION, SOLUTION INTRAMUSCULAR; INTRAVENOUS
Status: DISCONTINUED | OUTPATIENT
Start: 2020-06-18 | End: 2020-06-23 | Stop reason: HOSPADM

## 2020-06-18 RX ADMIN — TRAZODONE HYDROCHLORIDE 50 MG: 50 TABLET ORAL at 21:37

## 2020-06-18 RX ADMIN — BUSPIRONE HYDROCHLORIDE 7.5 MG: 5 TABLET ORAL at 17:03

## 2020-06-18 RX ADMIN — FLUOXETINE 40 MG: 20 CAPSULE ORAL at 08:40

## 2020-06-18 RX ADMIN — CLONIDINE HYDROCHLORIDE 0.2 MG: 0.1 TABLET ORAL at 21:37

## 2020-06-18 RX ADMIN — BUSPIRONE HYDROCHLORIDE 7.5 MG: 5 TABLET ORAL at 08:40

## 2020-06-18 RX ADMIN — ACETAMINOPHEN 650 MG: 325 TABLET, FILM COATED ORAL at 21:37

## 2020-06-18 NOTE — BH NOTES
0700  Received report from [facility], RN. Assumed care of the patient. Pt lying in bed upon assessment. Pt withdrawn and guarded this am.  Pt asking for new bandages for his neck. Asked pt what happened and he said he would rather not talk about it. Asked if it was bruises or cuts and pt stated bruises. This writer did observe bruises to bilateral neck after pt showered and he was given new bandages to cover them. Pt reported he was tired and nauseous this am but did not want to come out of his room for breakfast.  Pt rates depression as 5/10 and anxiety 7/10; pt denies SI/HI/AVH/pain. Last bm was yesterday.     0840  Pt compliant with am meds    0930  Pt awake in room    1210  Pt refusing lunch at this time    1700  Pt in day room for dinner    1703  Pt compliant with evening medication

## 2020-06-18 NOTE — PROGRESS NOTES
Problem: Suicide  Goal: *STG: Remains safe in hospital  Outcome: Progressing Towards Goal    Denies SI at present. Educated on importance of contacting staff if suicidal ideation develops. Pt states understanding and verbally contracts for safety. Problem: Depressed Mood (Adult/Pediatric)  Goal: *STG: Participates in treatment plan  Outcome: Progressing Towards Goal    Oriented pt to unit, nurse's station and room. Explained unit expectations and a typical day on the unit. Will review MAR w/ pt once meds are ordered. Pt states understanding.

## 2020-06-18 NOTE — PROGRESS NOTES
Problem: Discharge Planning  Goal: *Discharge to safe environment  Outcome: Progressing Towards Goal  Note: Patient identifies home as a safe environment. Patient will return home upon discharge. Goal: *Knowledge of medication management  Outcome: Progressing Towards Goal  Note: Patient verbalizes understanding of medication regimen. Patient is taking medications as prescribed. Goal: *Knowledge of discharge instructions  Outcome: Progressing Towards Goal  Note: Patient verbalizes understanding of goals for treatment and safe discharge.

## 2020-06-18 NOTE — BSMART NOTE
Access is aware of patient in need of bed placement. Access will consult with on- call for placement.
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
## 2020-06-18 NOTE — ED NOTES
TRANSFER - OUT REPORT:    Verbal report given to Cynthia RN(name) on Griselda Cosby  being transferred to [place] [address](unit) for routine progression of care       Report consisted of patients Situation, Background, Assessment and   Recommendations(SBAR). Information from the following report(s) SBAR, ED Summary and Recent Results was reviewed with the receiving nurse. Lines:       Opportunity for questions and clarification was provided.       Patient transported with:   Meijob

## 2020-06-18 NOTE — BH NOTES
Attempted to meet with Steffen Connolly today but he reports in a very quiet and soft voice, \"Umm, I don't really feel like talking. \" Explained that this writer meets individually with patients right now due to [place] instead of groups and he again states he does not feel like talking. Will continue to try to speak with patient for an individual session daily while he is admitted.     Eda Mora LPC LSATP [facility]
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
## 2020-06-18 NOTE — PROGRESS NOTES
[facility] Admission Pharmacy Medication Reconciliation     Information obtained from: Patient interview, chart review  RxQuery data available1: Yes (but outdated)    Comments/recommendations:    1) Patient is a good medication historian. Reports mostly compliant with medications. 2) Medication changes (since last review): None    3) Updated preferred outpatient pharmacy to [facility] in [facility]. 1RxQuery pharmacy benefit data reflects medications filled and processed through the patient's insurance, however                this data does NOT capture whether the medication was picked up or is currently being taken by the patient. Total Time Spent: 8 minutes    Patient allergies: Allergies as of 06/18/2020 - Review Complete 06/18/2020   Allergen Reaction Noted    Codeine Rash 04/23/2015    Augmentin [amoxicillin-pot clavulanate] Unknown (comments) 01/27/2014    Codeine Hives 04/13/2011    Other medication Rash 04/13/2011    Zoloft [sertraline] Unknown (comments) 01/27/2014       Prior to Admission Medications   Prescriptions Last Dose Informant Patient Reported? Taking? FLUoxetine (PROZAC) 40 mg capsule 6/16/2020 at Unknown time  No Yes   Sig: Take 1 Cap by mouth daily. Indications: major depressive disorder   busPIRone (BUSPAR) 7.5 mg tablet 6/16/2020 at Unknown time  Yes Yes   Sig: TAKE ONE TABLET BY MOUTH TWICE A DAY   cloNIDine HCl (CATAPRES) 0.2 mg tablet 6/16/2020 at Unknown time  No Yes   Sig: Take 1 Tab by mouth nightly.  Indications: PTSD adjunct      Facility-Administered Medications: None        Thank you,  Brannon Graff, PHARMD, [facility], [address] [place] [address] [address]: [phone] ([id number])  Pharmacy: [phone] ([id number])
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
## 2020-06-18 NOTE — PROGRESS NOTES
Problem: Suicide  Goal: *STG: Remains safe in hospital  Outcome: Progressing Towards Goal  Goal: *STG/LTG: Complies with medication therapy  Outcome: Progressing Towards Goal
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
## 2020-06-19 ENCOUNTER — APPOINTMENT (OUTPATIENT)
Dept: CT IMAGING | Age: 20
DRG: 751 | End: 2020-06-19
Attending: PSYCHIATRY & NEUROLOGY
Payer: MEDICAID

## 2020-06-19 PROCEDURE — 70450 CT HEAD/BRAIN W/O DYE: CPT

## 2020-06-19 PROCEDURE — 74011250637 HC RX REV CODE- 250/637: Performed by: NURSE PRACTITIONER

## 2020-06-19 PROCEDURE — 65220000003 HC RM SEMIPRIVATE PSYCH

## 2020-06-19 PROCEDURE — 74011250637 HC RX REV CODE- 250/637: Performed by: PSYCHIATRY & NEUROLOGY

## 2020-06-19 RX ORDER — IBUPROFEN 600 MG/1
600 TABLET ORAL
Status: DISCONTINUED | OUTPATIENT
Start: 2020-06-19 | End: 2020-06-20

## 2020-06-19 RX ADMIN — BUSPIRONE HYDROCHLORIDE 7.5 MG: 5 TABLET ORAL at 08:57

## 2020-06-19 RX ADMIN — CLONIDINE HYDROCHLORIDE 0.2 MG: 0.1 TABLET ORAL at 21:34

## 2020-06-19 RX ADMIN — TRAZODONE HYDROCHLORIDE 50 MG: 50 TABLET ORAL at 21:34

## 2020-06-19 RX ADMIN — IBUPROFEN 600 MG: 600 TABLET, FILM COATED ORAL at 17:10

## 2020-06-19 RX ADMIN — BUSPIRONE HYDROCHLORIDE 7.5 MG: 5 TABLET ORAL at 17:11

## 2020-06-19 RX ADMIN — FLUOXETINE 40 MG: 20 CAPSULE ORAL at 08:57

## 2020-06-19 RX ADMIN — ACETAMINOPHEN 650 MG: 325 TABLET, FILM COATED ORAL at 08:58

## 2020-06-19 NOTE — BH NOTES
Psychiatric Progress Note    Patient: Deven Jolly MRN: [id number]  SSN: [id number]    YOB: 2000  Age: 20 y.o. Sex: male      Admit Date: 6/18/2020    LOS: 1 day     Subjective:     Deven Jolly  reports feeling a little better than yesterday, but continues to be sullen and moods are depressed . Denies SI/HI/AH/VH. Spoke with sister for support. No aggression or violence. Appropriately interactive and aware. Tolerating medications well. Eating fairly and sleeping impaired,  Had and unwitnessed fall last evening and is complaining to staff of a headache though denying a head injury.     Objective:     Vitals:    06/18/20 0440 06/18/20 0735 06/18/20 2000 06/19/20 0733   BP: 130/65 126/72 139/71 121/70   Pulse: 64 68 87 80   Resp: 16 16 18 15   Temp: 98.3 °F (36.8 °C) 97.7 °F (36.5 °C) 98.5 °F (36.9 °C) 98.2 °F (36.8 °C)   SpO2: 98% 98%  99%   Weight: 70.1 kg (154 lb 9.6 oz)      Height: 5' 7\" (1.702 m)           Mental Status Exam:   Sensorium  oriented to time, place and person   Relations cooperative   Eye Contact appropriate   Appearance:  age appropriate   Speech:  non-pressured and soft   Thought Process: goal directed   Thought Content free of delusions and free of hallucinations   Suicidal ideations none   Mood:  depressed   Affect:  constricted   Memory   adequate   Concentration:  adequate   Insight:  impaired due to acute stress of situation   Judgment:  impaired due to acute stress of situation       MEDICATIONS:  Current Facility-Administered Medications   Medication Dose Route Frequency    OLANZapine (ZyPREXA) tablet 5 mg  5 mg Oral Q6H PRN    haloperidol lactate (HALDOL) injection 5 mg  5 mg IntraMUSCular Q6H PRN    benztropine (COGENTIN) tablet 1 mg  1 mg Oral BID PRN    diphenhydrAMINE (BENADRYL) injection 50 mg  50 mg IntraMUSCular BID PRN    hydrOXYzine HCL (ATARAX) tablet 50 mg  50 mg Oral TID PRN    LORazepam (ATIVAN) injection 1 mg  1 mg IntraMUSCular Q4H PRN  traZODone (DESYREL) tablet 50 mg  50 mg Oral QHS PRN    acetaminophen (TYLENOL) tablet 650 mg  650 mg Oral Q4H PRN    magnesium hydroxide (MILK OF MAGNESIA) 400 mg/5 mL oral suspension 30 mL  30 mL Oral DAILY PRN    busPIRone (BUSPAR) tablet 7.5 mg  7.5 mg Oral BID    FLUoxetine (PROzac) capsule 40 mg  40 mg Oral DAILY    cloNIDine HCL (CATAPRES) tablet 0.2 mg  0.2 mg Oral QHS      DISCUSSION:   the risks and benefits of the proposed medication  patient given opportunity to ask questions    Lab/Data Review: All lab results for the last 24 hours reviewed. No results found for this or any previous visit (from the past 24 hour(s)).       Assessment:     Principal Problem:    MDD (major depressive disorder) (7/7/2017)      Overview: Dr. Chandler rFaga:     Continue current care  Head CT w/o contrast  Collateral information  Disposition planning with social work    Signed By: Camden Barrow MD     June 19, 2020

## 2020-06-19 NOTE — BH NOTES
Post Fall Documentation    Liliam Balloon witnessed/unwitnessed fall occurred on 6/19/20  at [facility] 4037. The answers to the following questions summarize the fall: In the patient's own words:  · What were you attempting to do when you fell? Going to the  Bathroom to pee  · Do you know why you fell? Felt dizzy and nauseous  · Do you have any pain/discomfort or any other complaints? No  · Which part of your body made contact with the floor or other object? Lou Rivera down facing the wall - front of body  Nurse:  [facility] Was this an assisted fall? no   Was fall witnessed? No - I heard a thump   If witnessed, what part of the body made contact with the floor or other object?  n/a   Patients mental status after the fall/when found: Confused and Lethargic   Any apparent injury:  No apparent injury   Immediate interventions for injury/suspected injury? No interventions needed   Patient assisted back to bed? Assist X1   Name of provider notified and time, any comments? Arnie Jhatanner 59; monitor patient.  Name of family member notified and time: None notified    Document Immediate VS and physical assessment in flowsheets. Document Neuro assessment every hour x 4 (for potential head injury or unwitnessed fall) in flowsheets.         Edison Lam

## 2020-06-19 NOTE — PROGRESS NOTES
Problem: Falls - Risk of  Goal: *Absence of Falls  Description: Document Arik Poon Fall Risk and appropriate interventions in the flowsheet.   Outcome: Progressing Towards Goal  Note: Fall Risk Interventions:            Medication Interventions: Teach patient to arise slowly

## 2020-06-19 NOTE — BH NOTES
Was notified at 0330 by the nurse that the patient had an unwitnessed fall at [place] [address]. Went to [address] [place] [address] to assess the patient. Patient noted to be asleep in bed. Patient woke up to voice and is alert and oriented x 4. Denies any injuries after the fall. Patient did report that he felt dizzy \"fuzzy\"  before the fall but denies dizziness/ light headedness at this time. Patient appears pale. Denies blood in stool and urine. Vitals signs WNL, no pain voiced. MD notified with no further orders. Nursing Staff will continue to monitor closely.
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
## 2020-06-19 NOTE — PROGRESS NOTES
Problem: Suicide  Goal: *STG: Remains safe in hospital  Outcome: Progressing Towards Goal  Goal: *STG: Seeks staff when feelings of self harm or harm towards others arise  Outcome: Progressing Towards Goal  Goal: *STG/LTG: Complies with medication therapy  Outcome: Progressing Towards Goal

## 2020-06-19 NOTE — BH NOTES
Assumed nursing care of Farida Szymanski after receiving the 1900 change of shift report. Farida Szymanski was visible in the milieu prior to going to bed. However, he was withdrawn and was not noted to be very social with his peers. He presented with constricted affect and anxious, depressed mood. He acknowledged both problems especially the anxiety. He also reported all over body aches that is a frequent problem but stated he does not know why he has this pain, stated there has been no diagnosis but the chart listed Fibromyalgia in his past medical history. Pt was med compliant with his HS med and received Trazodone 50mg po at 2137 for c/o insomnia and Tylenol 650mg po for c/o all over pain. Pt soon went to bed after taking this. At [facility] 4037 another nurse heard a sound as if someone had fallen and pt acknowledged that he had fallen. Stated he was walking into the bathroom when he felt \"fuzzy\" and feels like he blacked out for a \"second or two\" and he felll with his face facing the wall of the bathroom. He denied hitting his head on anything and stated that he felt fine afterwards. This was not witnessed by staff. BP:  133/74, P:  86, R:  20, T:  98.2, O2 sat at 100 %. Standing BP:  121/75, P:  98.    Pt denied feeling like he had been hurt in any way. No problems noted. Will monitor closely for problems. Also monitoring for mood, behavior and for safety. Addendum:  1866:  BP:  118/62, R:  16, T:  99.0, P:  108, O2 sat:  100% via automatic machine. Pt is resting quietly w/o any complaints. Stated he feels fine. Scalp is w/o any noted nodules and pt again stated he did not hit his head. Addendum at 18:  Spoke with Marifer Rodriguez to report pt's fall. Was instructed to monitor. Leslie Lan, supervisor was also notified shortly before this. She came to the unit to assess Farida Szymanski. At the time after the fall pt was A&O X 3, no noted changes from his pre-fall behavior.    Safe-Care report was filled out.

## 2020-06-19 NOTE — INTERDISCIPLINARY ROUNDS
[facility] Interdisciplinary Rounds Patient Name: Carmella Rick  Age: 20 y.o. Room/Bed:  319/01 Primary Diagnosis: MDD (major depressive disorder) Admission Status: Voluntary Readmission within 30 days: no 
Power of  in place: no 
Patient requires a blocked bed: no           
Reason for blocked bed: Pt is in a private room due to [email or web address] Order for blocked bed obtained: no    
 
Sleep hours: 5 1/2 hrs Morning Labs completed per orders:  na    
Participation in Care/Groups:  no 
Medication Compliant?: Yes PRNS (last 24 hours): Sleep Aid and Pain Restraints (last 24 hours):  no 
Substance Abuse:  no , neg UDS on admission 24 hour chart check complete: yes Patient goal(s) for today:Continue taking medications as prescribed. Treatment team focus/goals: Stabilize on medications and contact support team.  
Progress note:  Pt remains depressed, flat and guarded. Pt states he is feeling better than when he arrived and understands it will take time to process and work through recent experiences. SW updated pt's  through [facility]. LOS:  1  Expected LOS: TBD Financial concerns/prescription coverage: [facility] Medicaid Family contact: Awaiting return call from grandmother. Family requesting physician contact today: No 
Discharge plan: Home Access to weapons: None involved. Outpatient provider(s): Warner PETER Patient's preferred phone number for follow up call:  
 
Participating treatment team members: RICARDO Edwards and Dr. Beatriz Howard

## 2020-06-19 NOTE — BH NOTES
0700  Received report from [facility], RN. Assumed care of the patient. Pt quiet and withdrawn upon interview. Pt states he is feeling a little bit better than he did when he first came in. Pt rates depression as 4/10 and anxiety as 1/10. Pt denies SI/HI/AVH. Pt has a headache 5/10 and requesting tylenol prn. Last bm was 2 days ago which is normal per pt. Educated pt to let us know if he needs a prn for constipation.       0800  Pt in day room for breakfast    0858  Pt compliant with am meds; given prn tylenol for c/o ha 5/10    0930  Pt reports tylenol ineffective at this time; pt's ha still 5/10, told pt will talk to the doctor about motrin for pain    1000  Pt in day room watching television at this time    1200  Pt in day room for lunch    1320  Pt brought down to complete CT scan    1450  Pt in day room for afternoon snack    1630 Pt in day room for dinner    1710  Pt compliant with evening meds; pt given prn motrin for c/o ha (see MAR)
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
## 2020-06-20 PROCEDURE — 74011250637 HC RX REV CODE- 250/637: Performed by: NURSE PRACTITIONER

## 2020-06-20 PROCEDURE — 65220000003 HC RM SEMIPRIVATE PSYCH

## 2020-06-20 RX ORDER — IBUPROFEN 600 MG/1
600 TABLET ORAL
Status: DISCONTINUED | OUTPATIENT
Start: 2020-06-20 | End: 2020-06-23 | Stop reason: HOSPADM

## 2020-06-20 RX ADMIN — ACETAMINOPHEN 650 MG: 325 TABLET, FILM COATED ORAL at 16:27

## 2020-06-20 RX ADMIN — CLONIDINE HYDROCHLORIDE 0.2 MG: 0.1 TABLET ORAL at 21:50

## 2020-06-20 RX ADMIN — BUSPIRONE HYDROCHLORIDE 7.5 MG: 5 TABLET ORAL at 16:06

## 2020-06-20 RX ADMIN — BUSPIRONE HYDROCHLORIDE 7.5 MG: 5 TABLET ORAL at 08:09

## 2020-06-20 RX ADMIN — FLUOXETINE 40 MG: 20 CAPSULE ORAL at 08:08

## 2020-06-20 RX ADMIN — TRAZODONE HYDROCHLORIDE 50 MG: 50 TABLET ORAL at 22:02

## 2020-06-20 NOTE — BH NOTES
Psychiatric Progress Note    Patient: Yuniel Leigh MRN: [id number]  SSN: [id number]    YOB: 2000  Age: 20 y.o. Sex: male      Admit Date: 6/18/2020    LOS: 2 days     Subjective:     Yuniel Leigh  reports feeling a little better than yesterday, but continues to be sullen and moods are depressed . Denies SI/HI/AH/VH. Spoke with sister for support. No aggression or violence. Appropriately interactive and aware. Tolerating medications well. Eating fairly and sleeping impaired,  Had and unwitnessed fall last evening and is complaining to staff of a headache though denying a head injury. 6/20 - Yuniel Leigh reports sleeping well but remains a little anxious. Moods are depressed. Denies SI/HI/AH/VH. No aggression or violence. Appropriately interactive and aware. Tolerating medications well. Eating and sleeping fairly. Notes feeling some improvements with therapy.     Objective:     Vitals:    06/18/20 0735 06/18/20 2000 06/19/20 0733 06/19/20 2000   BP: 126/72 139/71 121/70 148/85   Pulse: 68 87 80 61   Resp: 16 18 15 16   Temp: 97.7 °F (36.5 °C) 98.5 °F (36.9 °C) 98.2 °F (36.8 °C) 98.4 °F (36.9 °C)   SpO2: 98%  99% 99%   Weight:       Height:            Mental Status Exam:   Sensorium  oriented to time, place and person   Relations cooperative   Eye Contact appropriate   Appearance:  age appropriate   Speech:  non-pressured and soft   Thought Process: goal directed   Thought Content free of delusions and free of hallucinations   Suicidal ideations none   Mood:  depressed   Affect:  constricted   Memory   adequate   Concentration:  adequate   Insight:  impaired due to acute stress of situation   Judgment:  impaired due to acute stress of situation       MEDICATIONS:  Current Facility-Administered Medications   Medication Dose Route Frequency    ibuprofen (MOTRIN) tablet 600 mg  600 mg Oral Q6H PRN    OLANZapine (ZyPREXA) tablet 5 mg  5 mg Oral Q6H PRN    haloperidol lactate (HALDOL) injection 5 mg  5 mg IntraMUSCular Q6H PRN    benztropine (COGENTIN) tablet 1 mg  1 mg Oral BID PRN    diphenhydrAMINE (BENADRYL) injection 50 mg  50 mg IntraMUSCular BID PRN    hydrOXYzine HCL (ATARAX) tablet 50 mg  50 mg Oral TID PRN    LORazepam (ATIVAN) injection 1 mg  1 mg IntraMUSCular Q4H PRN    traZODone (DESYREL) tablet 50 mg  50 mg Oral QHS PRN    acetaminophen (TYLENOL) tablet 650 mg  650 mg Oral Q4H PRN    magnesium hydroxide (MILK OF MAGNESIA) 400 mg/5 mL oral suspension 30 mL  30 mL Oral DAILY PRN    busPIRone (BUSPAR) tablet 7.5 mg  7.5 mg Oral BID    FLUoxetine (PROzac) capsule 40 mg  40 mg Oral DAILY    cloNIDine HCL (CATAPRES) tablet 0.2 mg  0.2 mg Oral QHS      DISCUSSION:   the risks and benefits of the proposed medication  patient given opportunity to ask questions    Lab/Data Review: All lab results for the last 24 hours reviewed. No results found for this or any previous visit (from the past 24 hour(s)). Head CT:  IMPRESSION:   No acute process or change compared to the prior exam    Assessment:     Principal Problem:    MDD (major depressive disorder) (7/7/2017)      Overview: Dr. Rosanne Rios        Plan:     Continue current care  Collateral information  Disposition planning with social work    Signed By: Tiffany Ann MD     June 20, 2020

## 2020-06-20 NOTE — BH NOTES
Assumed care of patient from day shift nurse at [address] [place] [address]. Patient observed in the day room watching a movie. Patient currently denies SI/HI/AVH. Patient is guarded affect is flat. Reports increased anxiety 7/10. No other voiced concerns at this time. 2134  Med compliant. PRN Trazodone requested and given. 0600 Nursing rounds completed. No issues. Patient slept for 7.5 hours this shift.

## 2020-06-20 NOTE — PROGRESS NOTES
Problem: Suicide  Goal: *STG/LTG: Complies with medication therapy  Outcome: Progressing Towards Goal

## 2020-06-20 NOTE — BH NOTES
Attempted to meet with patient for an individual session. Patient recognized this writer from prior hospitalizations, and stated that he was tired and did not want to talk, but asked if this writer would check in with him this afternoon. Will follow up this afternoon. Cy Castellanos, CORDELL/MFT Candidate      *Checked in this afternoon. Patient declined session.

## 2020-06-21 PROCEDURE — 65220000003 HC RM SEMIPRIVATE PSYCH

## 2020-06-21 PROCEDURE — 74011250637 HC RX REV CODE- 250/637: Performed by: NURSE PRACTITIONER

## 2020-06-21 RX ADMIN — BUSPIRONE HYDROCHLORIDE 7.5 MG: 5 TABLET ORAL at 16:32

## 2020-06-21 RX ADMIN — BUSPIRONE HYDROCHLORIDE 7.5 MG: 5 TABLET ORAL at 08:23

## 2020-06-21 RX ADMIN — TRAZODONE HYDROCHLORIDE 50 MG: 50 TABLET ORAL at 21:48

## 2020-06-21 RX ADMIN — FLUOXETINE 40 MG: 20 CAPSULE ORAL at 08:23

## 2020-06-21 NOTE — BH NOTES
Psychiatric Progress Note    Patient: Liliam Pozo MRN: [id number]  SSN: [id number]    YOB: 2000  Age: 20 y.o. Sex: male      Admit Date: 6/18/2020    LOS: 3 days     Subjective:     Liliam Pozo  reports feeling a little better than yesterday, but continues to be sullen and moods are depressed . Denies SI/HI/AH/VH. Spoke with sister for support. No aggression or violence. Appropriately interactive and aware. Tolerating medications well. Eating fairly and sleeping impaired,  Had and unwitnessed fall last evening and is complaining to staff of a headache though denying a head injury. 6/20 - Liliam Pozo reports sleeping well but remains a little anxious. Moods are depressed. Denies SI/HI/AH/VH. No aggression or violence. Appropriately interactive and aware. Tolerating medications well. Eating and sleeping fairly. Notes feeling some improvements with therapy. 6/21 - Liliam Pozo reports feeling tired , but got rest last evening. Moods are fair. Denies SI/HI/AH/VH. No aggression or violence. Still sullen but appropriately interactive and aware. Tolerating medications well. Eating and sleeping fairly.   Enjoyed therapy yesterday      Objective:     Vitals:    06/20/20 0850 06/20/20 1930 06/20/20 2150 06/21/20 0730   BP: 135/84 117/81 117/81 111/61   Pulse: 66 65 65 61   Resp: 16 16  15   Temp: 98.3 °F (36.8 °C) 97.8 °F (36.6 °C)  97.6 °F (36.4 °C)   SpO2: 100% 99%  99%   Weight:       Height:            Mental Status Exam:   Sensorium  oriented to time, place and person   Relations cooperative   Eye Contact appropriate   Appearance:  age appropriate   Speech:  non-pressured and soft   Thought Process: goal directed   Thought Content free of delusions and free of hallucinations   Suicidal ideations none   Mood:  depressed   Affect:  constricted   Memory   adequate   Concentration:  adequate   Insight:  impaired due to acute stress of situation   Judgment:  impaired due to acute stress of situation       MEDICATIONS:  Current Facility-Administered Medications   Medication Dose Route Frequency    ibuprofen (MOTRIN) tablet 600 mg  600 mg Oral Q6H PRN    OLANZapine (ZyPREXA) tablet 5 mg  5 mg Oral Q6H PRN    haloperidol lactate (HALDOL) injection 5 mg  5 mg IntraMUSCular Q6H PRN    benztropine (COGENTIN) tablet 1 mg  1 mg Oral BID PRN    diphenhydrAMINE (BENADRYL) injection 50 mg  50 mg IntraMUSCular BID PRN    hydrOXYzine HCL (ATARAX) tablet 50 mg  50 mg Oral TID PRN    LORazepam (ATIVAN) injection 1 mg  1 mg IntraMUSCular Q4H PRN    traZODone (DESYREL) tablet 50 mg  50 mg Oral QHS PRN    acetaminophen (TYLENOL) tablet 650 mg  650 mg Oral Q4H PRN    magnesium hydroxide (MILK OF MAGNESIA) 400 mg/5 mL oral suspension 30 mL  30 mL Oral DAILY PRN    busPIRone (BUSPAR) tablet 7.5 mg  7.5 mg Oral BID    FLUoxetine (PROzac) capsule 40 mg  40 mg Oral DAILY    cloNIDine HCL (CATAPRES) tablet 0.2 mg  0.2 mg Oral QHS      DISCUSSION:   the risks and benefits of the proposed medication  patient given opportunity to ask questions    Lab/Data Review: All lab results for the last 24 hours reviewed. No results found for this or any previous visit (from the past 24 hour(s)). Head CT:  IMPRESSION:   No acute process or change compared to the prior exam    Assessment:     Principal Problem:    MDD (major depressive disorder) (7/7/2017)      Overview: Dr. Lopez Olsen Shaper:     Continue current care  Collateral information  Disposition planning with social work for the next few days    Signed By: Shabana Dsouza MD     June 21, 2020

## 2020-06-21 NOTE — BH NOTES
Assumed care of patient and given report by off going shift. Pt was out of his room tonight and watching t.v.  He tends to stay to himself and won't exert himself with staff or peers unless first spoken to. He appears flat, is anxious and sad. He told me tonight that he was having a hard time because tomorrow is Father's Day. He didn't want to discuss it but said that his father wasn't in his life and it was a long story but that he was feeling very depressed over it. I also discovered tonight that he is also an identical twin. He denies SI/HI or AVH currently but his anxiety/depression is high. He is medication compliant. He did have evening snack. He did ask for prn trazodone to sleep along with his scheduled meds. Pt denied pain. He continues to be on 15 min safety checks. Will continue to monitor and treat.

## 2020-06-21 NOTE — BH NOTES
0700  Received report from GERARDO NICHOLS. Assumed care of the patient. Pt lying in bed upon assessment. Denies anxiety/depression/SI/HI/AVH/pain. Last bm was yesterday.     0815  Pt in day room eating breakfast    0823  Pt compliant with am meds    0900  Took pt to seclusion room to shower because his water wasn't getting hot and pt stated the water wasn't warm in the seclusion room either so went back to his room    1000  Pt awake in bed    1200  Pt in day room eating lunch    1400  Pt in bed resting quietly    1600  Pt in day room watching tv    1632  Pt compliant with evening meds    1700  Pt in day room eating dinner    1800  Pt awake in room at this time

## 2020-06-21 NOTE — INTERDISCIPLINARY ROUNDS
Behavioral Health Interdisciplinary Rounds  
  
Patient Name: Blane Gonzalez              Age: 20 y.o. Room/Bed:  319/01 Primary Diagnosis: MDD (major depressive disorder) Admission Status: Voluntary Readmission within 30 days: no 
Power of  in place: no 
Patient requires a blocked bed: yes Reason for blocked bed: COVID Order for blocked bed obtained: n/a    
  
Sleep hours: 7.5 Morning Labs completed per orders:  none ordered Participation in Care/Groups:  yes Medication Compliant?: Yes PRNS (last 24 hours): Sleep Aid, pain Restraints (last 24 hours):  no 
Substance Abuse:  no              
24 hour chart check complete: yes

## 2020-06-22 PROCEDURE — 65220000003 HC RM SEMIPRIVATE PSYCH

## 2020-06-22 PROCEDURE — 74011250637 HC RX REV CODE- 250/637: Performed by: NURSE PRACTITIONER

## 2020-06-22 RX ADMIN — HYDROXYZINE HYDROCHLORIDE 50 MG: 25 TABLET, FILM COATED ORAL at 16:38

## 2020-06-22 RX ADMIN — BUSPIRONE HYDROCHLORIDE 7.5 MG: 5 TABLET ORAL at 16:38

## 2020-06-22 RX ADMIN — FLUOXETINE 40 MG: 20 CAPSULE ORAL at 08:05

## 2020-06-22 RX ADMIN — CLONIDINE HYDROCHLORIDE 0.2 MG: 0.1 TABLET ORAL at 21:22

## 2020-06-22 RX ADMIN — BUSPIRONE HYDROCHLORIDE 7.5 MG: 5 TABLET ORAL at 08:06

## 2020-06-22 NOTE — PROGRESS NOTES
Problem: Suicide  Goal: *STG: Remains safe in hospital  Outcome: Progressing Towards Goal  Goal: *STG: Seeks staff when feelings of self harm or harm towards others arise  Outcome: Progressing Towards Goal  Goal: *STG/LTG: Complies with medication therapy  Outcome: Progressing Towards Goal     Problem: Depressed Mood (Adult/Pediatric)  Goal: *STG: Participates in treatment plan  Outcome: Progressing Towards Goal

## 2020-06-22 NOTE — INTERDISCIPLINARY ROUNDS
Behavioral Health Interdisciplinary Rounds  
  
Patient Name: Conor Bhandari              Age: 19 y.o.          Room/Bed:  319/01 Primary Diagnosis: MDD (major depressive disorder)     
Admission Status: Voluntary                            
Readmission within 30 days: No 
Power of  in place: Unknown Patient requires a blocked bed: No           
Reason for blocked bed: Patient is in private room due to [facility] Order for blocked bed obtained: No 
  
Sleep hours: About 6.5 hours. Morning Labs completed per orders: None ordered                                 
Participation in Care/Groups: NA Medication Compliant?: Yes PRNS (last 24 hours): Sleep Aid                     
Restraints (last 24 hours):  No 
Substance Abuse: No              
24 hour chart check complete:  
  
 
Patient goal(s) for today:Continue taking medications as prescribed. Treatment team focus/goals: Stabilize on medications and contact support team.  
Progress note:  Pt's mood is improving and affect is brighter. SW updated Risa Marroquin from [facility] and place referral for individual therapy through [address] outpatient. Awaiting return call from [employer].  
  
LOS:  4                        Expected LOS: TBD 
  
Financial concerns/prescription coverage: [employer] [facility] [employer] Medicaid Family contact: Awaiting return call from grandmother. Family requesting physician contact today: No 
Discharge plan: Home Access to weapons: None involved. Outpatient provider(s): Warner PETER Patient's preferred phone number for follow up call:  
  
Participating treatment team members: RICARDO Vance and Dr. Genny Almazan

## 2020-06-22 NOTE — PROGRESS NOTES
Problem: Suicide  Goal: *STG: Remains safe in hospital  Outcome: Progressing Towards Goal  Goal: *STG/LTG: No longer expresses self destructive or suicidal thoughts  Outcome: Progressing Towards Goal     Problem: Depressed Mood (Adult/Pediatric)  Goal: *STG: Participates in treatment plan  Outcome: Progressing Towards Goal  Goal: *STG: Complies with medication therapy  Outcome: Progressing Towards Goal
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
## 2020-06-22 NOTE — BH NOTES
Assumed care of the patient. Patient was visible in the milieu but mostly he kept to himself. He was cooperative with the assessments and  somewhat interactive. Affect was blunted but he stated that he was not anxious or depressed and denied S.I/H. I/A/V/H. He described his mood as \"Neutral- not like really bad or super happy. \"     Patient's heart rate was running  low on 50s  when it was checked manually or with the machine. Scheduled Clonidine was held. Patient received PRN PO Trazodone for insomnia at 2148. Will continue to monitor. Patient slept for about 6.5 hours.

## 2020-06-23 VITALS
WEIGHT: 154.6 LBS | RESPIRATION RATE: 16 BRPM | HEART RATE: 82 BPM | BODY MASS INDEX: 24.27 KG/M2 | DIASTOLIC BLOOD PRESSURE: 72 MMHG | HEIGHT: 67 IN | OXYGEN SATURATION: 98 % | SYSTOLIC BLOOD PRESSURE: 144 MMHG | TEMPERATURE: 97.7 F

## 2020-06-23 PROCEDURE — 74011250637 HC RX REV CODE- 250/637: Performed by: NURSE PRACTITIONER

## 2020-06-23 RX ADMIN — BUSPIRONE HYDROCHLORIDE 7.5 MG: 5 TABLET ORAL at 08:47

## 2020-06-23 RX ADMIN — FLUOXETINE 40 MG: 20 CAPSULE ORAL at 08:47

## 2020-06-23 NOTE — PROGRESS NOTES
2000: Assumed care of patient after receiving shift report from outgoing nurse. 2200: Jovi Jiang has been withdrawn to room out for meals only. He makes little eye contact. Affect is sad, mood is depressed. Hygiene is adequate, independent in ADLs. Gait was not observed. Sleep and appetite patterns WNL per Jovi Jiang. He reports no BM for 2 days but declines prn milk of magnesia. Endorses depression but denies SI/HI. Denies hallucinations at present. Verbally contracts for safety. Pt encouraged to continue to participate in care. Will continue to monitor pt with q 15 min checks. 2313: Chart review revealed fall during hospitalization that writer was not previously aware of. Fall risk score updated. Patient on high fall precautions. He is currently asleep in bed with even respirations. Will monitor. 0700: Patient slept in bed with even respirations for a total of 8 hours.        Problem: Depressed Mood (Adult/Pediatric)  Goal: *STG: Demonstrates reduction in symptoms and increase in insight into coping skills/future focused  Outcome: Progressing Towards Goal

## 2020-06-23 NOTE — INTERDISCIPLINARY ROUNDS
Behavioral Health Interdisciplinary Rounds Patient Name: Camille Trent  Age: 20 y.o. Room/Bed:  319/01 Primary Diagnosis: MDD (major depressive disorder) Admission Status: Voluntary Readmission within 30 days: no 
Power of  in place: no 
Patient requires a blocked bed: yes Reason for blocked bed: COVID Order for blocked bed obtained: no    
 
Sleep hours: 8 Morning Labs completed per orders:  n/a Participation in Care/Groups:  yes Medication Compliant?: Yes PRNS (last 24 hours): Antianxiety Restraints (last 24 hours):  no 
Substance Abuse:  no   
24 hour chart check complete: yes

## 2020-06-23 NOTE — PROGRESS NOTES
Pharmacist Discharge Medication Reconciliation    Discharging Provider: Dr. Ghazal Villagran    Chief Complaint for this Admission: No chief complaint on file. Allergies: Codeine; Augmentin [amoxicillin-pot clavulanate]; Codeine; Other medication; and Zoloft [sertraline]    Discharge Medications:   Current Discharge Medication List        CONTINUE these medications which have NOT CHANGED    Details   busPIRone (BUSPAR) 7.5 mg tablet TAKE ONE TABLET BY MOUTH TWICE A DAY      FLUoxetine (PROZAC) 40 mg capsule Take 1 Cap by mouth daily. Indications: major depressive disorder  Qty: 30 Cap, Refills: 0      cloNIDine HCl (CATAPRES) 0.2 mg tablet Take 1 Tab by mouth nightly.  Indications: PTSD adjunct  Qty: 30 Tab, Refills: 0             The patient's chart, MAR and AVS were reviewed by Lito Salinas, PHILD, BCPS

## 2020-06-23 NOTE — BH NOTES
Behavioral Health Transition Record to Provider    Patient Name: Tania Gonzales  YOB: 2000  Medical Record Number: [id number]  Date of Admission: 6/18/2020  Date of Discharge: 6/24/2020    Attending Provider: Uri Barbour MD  Discharging Provider: Uri Barbour MD  To contact this individual call [phone] and ask the  to page. If unavailable, ask to be transferred to [address] [place] [address] Provider on call. [facility] Provider will be available on call 24/7 and during holidays.     Primary Care Provider: Zenia Montgomery MD    Allergies   Allergen Reactions    Codeine Rash    Augmentin [Amoxicillin-Pot Clavulanate] Unknown (comments)    Codeine Hives    Other Medication Rash     Allergic to peanut butter    Zoloft [Sertraline] Unknown (comments)     Made him \"act weird\"       Reason for Admission: SI    Admission Diagnosis: MDD (major depressive disorder) [F32.9]    * No surgery found *    Results for orders placed or performed during the hospital encounter of 06/17/20   URINALYSIS W/ REFLEX CULTURE   Result Value Ref Range    Color YELLOW/STRAW      Appearance CLEAR CLEAR      Specific gravity 1.011 1.003 - 1.030      pH (UA) 8.0 5.0 - 8.0      Protein Negative NEG mg/dL    Glucose Negative NEG mg/dL    Ketone Negative NEG mg/dL    Bilirubin Negative NEG      Blood TRACE (A) NEG      Urobilinogen 1.0 0.2 - 1.0 EU/dL    Nitrites Negative NEG      Leukocyte Esterase Negative NEG      WBC 0-4 0 - 4 /hpf    RBC 5-10 0 - 5 /hpf    Epithelial cells FEW FEW /lpf    Bacteria Negative NEG /hpf    UA:UC IF INDICATED CULTURE NOT INDICATED BY UA RESULT CNI      Hyaline cast 0-2 0 - 5 /lpf   DRUG SCREEN, URINE   Result Value Ref Range    AMPHETAMINES Negative NEG      BARBITURATES Negative NEG      BENZODIAZEPINES Negative NEG      COCAINE Negative NEG      METHADONE Negative NEG      OPIATES Negative NEG      PCP(PHENCYCLIDINE) Negative NEG      THC (TH-CANNABINOL) Negative NEG Drug screen comment (NOTE)        Immunizations administered during this encounter:   Immunization History   Administered Date(s) Administered    DTaP 2000, 02/17/2000, 05/16/2000, 02/23/2001, 09/09/2004    HPV (9-valent) 06/26/2018    Hep B Vaccine 2000, 05/16/2000, 08/15/2000    Hib 2000, 02/17/2000, 05/16/2000, 02/23/2001    Influenza Vaccine 11/07/2000, 12/18/2000, 11/19/2003, 09/24/2012    MMR 02/23/2001, 09/09/2004    Pneumococcal Vaccine (Unspecified Type) 05/16/2000, 08/15/2000, 11/07/2000    Poliovirus vaccine 2000, 02/17/2000, 05/16/2000, 09/09/2004    Td 07/14/2011    Varicella Virus Vaccine 11/07/2000       Screening for Metabolic Disorders for Patients on Antipsychotic Medications  (Data obtained from the EMR)    Estimated Body Mass Index  Estimated body mass index is 24.21 kg/m² as calculated from the following:    Height as of this encounter: 5' 7\" (1.702 m). Weight as of this encounter: 70.1 kg (154 lb 9.6 oz). Vital Signs/Blood Pressure  Visit Vitals  /72   Pulse 82   Temp 97.7 °F (36.5 °C)   Resp 16   Ht 5' 7\" (1.702 m)   Wt 70.1 kg (154 lb 9.6 oz)   SpO2 98%   BMI 24.21 kg/m²       Blood Glucose/Hemoglobin A1c  Lab Results   Component Value Date/Time    Glucose 80 02/21/2020 02:05 PM    Glucose (POC) 98 12/26/2014 10:22 PM       Lab Results   Component Value Date/Time    Hemoglobin A1c 4.8 01/03/2020 05:06 AM        Lipid Panel  Lab Results   Component Value Date/Time    Cholesterol, total 151 01/03/2020 05:06 AM    HDL Cholesterol 42 01/03/2020 05:06 AM    LDL, calculated 87.4 01/03/2020 05:06 AM    Triglyceride 108 01/03/2020 05:06 AM    CHOL/HDL Ratio 3.6 01/03/2020 05:06 AM        Discharge Diagnosis: Please refer to physician's discharge summary. Discharge Plan: The patient Debra Guzman exhibits the ability to control behavior in a less restrictive environment. Patient's level of functioning is improving.   No assaultive/destructive behavior has been observed for the past 24 hours. No suicidal/homicidal threat or behavior has been observed for the past 24 hours. There is no evidence of serious medication side effects. Patient has not been in physical or protective restraints for at least the past 24 hours. If weapons involved, how are they secured? None involved. Is patient aware of and in agreement with discharge plan? Yes    Arrangements for medication:  Prescriptions faxed to local pharmacy. Copy of discharge instructions to provider?:  [address] for transportation home:  Sister to transport. Keep all follow up appointments as scheduled, continue to take prescribed medications per physician instructions. Mental health crisis number:  [phone] or your local mental health crisis line number at [phone]    Discharge Medication List and Instructions:   Discharge Medication List as of 6/23/2020  2:55 PM      CONTINUE these medications which have NOT CHANGED    Details   busPIRone (BUSPAR) 7.5 mg tablet TAKE ONE TABLET BY MOUTH TWICE A DAY, Historical Med      FLUoxetine (PROZAC) 40 mg capsule Take 1 Cap by mouth daily. Indications: major depressive disorder, Normal, Disp-30 Cap, R-0      cloNIDine HCl (CATAPRES) 0.2 mg tablet Take 1 Tab by mouth nightly. Indications: PTSD adjunct, Normal, Disp-30 Tab, R-0             Unresulted Labs (24h ago, onward)    None        To obtain results of studies pending at discharge, please contact N/A.      Follow-up Information     Follow up With Specialties Details Why [facility]   Call on 6/25/2020 Medication managemenet appointment on Thursday, June 25th at 10:30 AM by phone with LILI Araiza [id number]   Address: [address]  [place], [address] [facility]   Phone: [phone]  Fax:    [phone] [facility]  Call today Please follow up with your mental health skill builder upon discharge. Address: [address] [place] [address],[address]  Phone: [phone] Outpatient   Call on 7/1/2020 Therapy appointment on Wednesday, July 1st at 10:00AM with Jeff Ty. Address: [address] [place] [address] Tammy [address] Renata Ortega, [address]  Phone: [phone]  Fax:  [phone]          Advanced Directive:   Does the patient have an appointed surrogate decision maker? Unknown   Does the patient have a Medical Advance Directive? Unknown   Does the patient have a Psychiatric Advance Directive? Unknown   If the patient does not have a surrogate or Medical Advance Directive AND Psychiatric Advance Directive, the patient was offered information on these advance directives. Unknown     Patient Instructions: Please continue all medications until otherwise directed by physician. Tobacco Cessation Discharge Plan:   Is the patient a smoker and needs referral for smoking cessation? No  Patient referred to the following for smoking cessation with an appointment? No   Patient was offered medication to assist with smoking cessation at discharge? No  Was education for smoking cessation added to the discharge instructions? No     Alcohol/Substance Abuse Discharge Plan:   Does the patient have a history of substance/alcohol abuse and requires a referral for treatment? Yes  Patient referred to the following for substance/alcohol abuse treatment with an appointment? Yes  Patient was offered medication to assist with alcohol cessation at discharge? No  Was education for substance/alcohol abuse added to discharge instructions? Yes     Patient discharged to Home; provided to the patient/caregiver either in hard copy or electronically. Continuing care paperwork was faxed to community mental health providers.

## 2020-06-23 NOTE — DISCHARGE INSTRUCTIONS
DISCHARGE SUMMARY    NAME:Conor Salas  : 2000  MRN: [id number]    The patient Abram May exhibits the ability to control behavior in a less restrictive environment. Patient's level of functioning is improving. No assaultive/destructive behavior has been observed for the past 24 hours. No suicidal/homicidal threat or behavior has been observed for the past 24 hours. There is no evidence of serious medication side effects. Patient has not been in physical or protective restraints for at least the past 24 hours. If weapons involved, how are they secured? None involved. Is patient aware of and in agreement with discharge plan? Yes    Arrangements for medication:  Prescriptions faxed to local pharmacy. Copy of discharge instructions to provider?:  [address] for transportation home:  Sister to transport. Keep all follow up appointments as scheduled, continue to take prescribed medications per physician instructions. Mental health crisis number:  670 or your local mental health crisis line number at [phone].
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
## 2020-06-23 NOTE — BH NOTES
Psychiatric Progress Note    Patient: Netta Mcgee MRN: [id number]  SSN: [id number]    YOB: 2000  Age: 20 y.o. Sex: male      Admit Date: 6/18/2020    LOS: 4 days     Subjective:     Netta Mcgee  reports feeling a little better than yesterday, but continues to be sullen and moods are depressed . Denies SI/HI/AH/VH. Spoke with sister for support. No aggression or violence. Appropriately interactive and aware. Tolerating medications well. Eating fairly and sleeping impaired,  Had and unwitnessed fall last evening and is complaining to staff of a headache though denying a head injury. 6/20 - Netta Mcgee reports sleeping well but remains a little anxious. Moods are depressed. Denies SI/HI/AH/VH. No aggression or violence. Appropriately interactive and aware. Tolerating medications well. Eating and sleeping fairly. Notes feeling some improvements with therapy. 6/21 - Netta Mcgee reports feeling tired , but got rest last evening. Moods are fair. Denies SI/HI/AH/VH. No aggression or violence. Still sullen but appropriately interactive and aware. Tolerating medications well. Eating and sleeping fairly. Enjoyed therapy yesterday    6/22 - Netta Mcgee reports feeling much better overall. Less sad and demonstrated a fair range of affect. Smiling more. Moods are good. Denies SI/HI/AH/VH. No aggression or violence. Appropriately interactive and aware. Tolerating medications well. Sleeping fairly. Isolates but comes out to eat.       Objective:     Vitals:    06/21/20 1919 06/21/20 2136 06/22/20 0846 06/22/20 1937   BP: 127/70  116/72 125/78   Pulse: (!) 54 (!) 58 (!) 59 86   Resp: 16  16 16   Temp: 98.5 °F (36.9 °C)  98.2 °F (36.8 °C) 98.6 °F (37 °C)   SpO2:   99% 99%   Weight:       Height:            Mental Status Exam:   Sensorium  oriented to time, place and person   Relations cooperative   Eye Contact appropriate   Appearance:  age appropriate   Speech: non-pressured and soft   Thought Process: goal directed   Thought Content free of delusions and free of hallucinations   Suicidal ideations none   Mood:  depressed   Affect:  Fair range   Memory   adequate   Concentration:  adequate   Insight:  iFair   Judgment:  Fair       MEDICATIONS:  Current Facility-Administered Medications   Medication Dose Route Frequency    ibuprofen (MOTRIN) tablet 600 mg  600 mg Oral Q6H PRN    OLANZapine (ZyPREXA) tablet 5 mg  5 mg Oral Q6H PRN    haloperidol lactate (HALDOL) injection 5 mg  5 mg IntraMUSCular Q6H PRN    benztropine (COGENTIN) tablet 1 mg  1 mg Oral BID PRN    diphenhydrAMINE (BENADRYL) injection 50 mg  50 mg IntraMUSCular BID PRN    hydrOXYzine HCL (ATARAX) tablet 50 mg  50 mg Oral TID PRN    LORazepam (ATIVAN) injection 1 mg  1 mg IntraMUSCular Q4H PRN    traZODone (DESYREL) tablet 50 mg  50 mg Oral QHS PRN    acetaminophen (TYLENOL) tablet 650 mg  650 mg Oral Q4H PRN    magnesium hydroxide (MILK OF MAGNESIA) 400 mg/5 mL oral suspension 30 mL  30 mL Oral DAILY PRN    busPIRone (BUSPAR) tablet 7.5 mg  7.5 mg Oral BID    FLUoxetine (PROzac) capsule 40 mg  40 mg Oral DAILY    cloNIDine HCL (CATAPRES) tablet 0.2 mg  0.2 mg Oral QHS      DISCUSSION:   the risks and benefits of the proposed medication  patient given opportunity to ask questions    Lab/Data Review: All lab results for the last 24 hours reviewed. No results found for this or any previous visit (from the past 24 hour(s)). Head CT:  IMPRESSION:   No acute process or change compared to the prior exam    Assessment:     Principal Problem:    MDD (major depressive disorder) (7/7/2017)      Overview: Dr. Christiana Clemente:     Continue current care  Collateral information  Disposition planning with social work for the next few days    Signed By: Liu Carias MD     June 22, 2020

## 2020-06-23 NOTE — PROGRESS NOTES
Problem: Depressed Mood (Adult/Pediatric)  Goal: *STG: Participates in treatment plan  Outcome: Progressing Towards Goal  Goal: *STG: Remains safe in hospital  Outcome: Progressing Towards Goal  Goal: *STG: Complies with medication therapy  Outcome: Progressing Towards Goal     Problem: Falls - Risk of  Goal: *Absence of Falls  Description: Document Jacquelyn Fall Risk and appropriate interventions in the flowsheet.   Outcome: Progressing Towards Goal  Note: Fall Risk Interventions:            Medication Interventions: Teach patient to arise slowly

## 2020-06-23 NOTE — PROGRESS NOTES
Diet as tolerated. Pt starting to be meal compliant. Hx notable for constipation.   Ht: 5'7\"  Wt: 154 lb 5.6 oz  BMI: 24.21 kg/(m^2) c/w normal weight  Est energy needs: 1800 kcal, 68 g protein, 1 mL/kcal fluids  Pt will consume > 75% of meals at follow up 7-10 days  LOS

## 2020-11-11 ENCOUNTER — HOSPITAL ENCOUNTER (EMERGENCY)
Age: 20
Discharge: HOME | End: 2020-11-11
Attending: EMERGENCY MEDICINE
Payer: MEDICARE

## 2020-11-11 ENCOUNTER — APPOINTMENT (OUTPATIENT)
Dept: CT IMAGING | Age: 20
End: 2020-11-11
Attending: NURSE PRACTITIONER
Payer: MEDICARE

## 2020-11-11 VITALS
SYSTOLIC BLOOD PRESSURE: 131 MMHG | BODY MASS INDEX: 24.17 KG/M2 | OXYGEN SATURATION: 99 % | HEIGHT: 67 IN | WEIGHT: 154 LBS | RESPIRATION RATE: 16 BRPM | HEART RATE: 51 BPM | TEMPERATURE: 98.2 F | DIASTOLIC BLOOD PRESSURE: 60 MMHG

## 2020-11-11 DIAGNOSIS — N20.1 LEFT URETERAL STONE: Primary | ICD-10-CM

## 2020-11-11 LAB
AMORPH CRY URNS QL MICRO: ABNORMAL
ANION GAP SERPL CALC-SCNC: 13 MMOL/L (ref 5–15)
APPEARANCE UR: ABNORMAL
BACTERIA URNS QL MICRO: ABNORMAL /HPF
BASOPHILS # BLD: 0 K/UL (ref 0–0.1)
BASOPHILS NFR BLD: 0 % (ref 0–1)
BILIRUB UR QL: NEGATIVE
BUN SERPL-MCNC: 12 MG/DL (ref 6–20)
BUN/CREAT SERPL: 11 (ref 12–20)
CALCIUM SERPL-MCNC: 9.4 MG/DL (ref 8.5–10.1)
CHLORIDE SERPL-SCNC: 104 MMOL/L (ref 97–108)
CO2 SERPL-SCNC: 25 MMOL/L (ref 21–32)
COLOR UR: ABNORMAL
CREAT SERPL-MCNC: 1.07 MG/DL (ref 0.7–1.3)
DIFFERENTIAL METHOD BLD: ABNORMAL
EOSINOPHIL # BLD: 0 K/UL (ref 0–0.4)
EOSINOPHIL NFR BLD: 0 % (ref 0–7)
EPITH CASTS URNS QL MICRO: ABNORMAL /LPF
ERYTHROCYTE [DISTWIDTH] IN BLOOD BY AUTOMATED COUNT: 12.3 % (ref 11.5–14.5)
GLUCOSE SERPL-MCNC: 121 MG/DL (ref 65–100)
GLUCOSE UR STRIP.AUTO-MCNC: NEGATIVE MG/DL
HCT VFR BLD AUTO: 45 % (ref 36.6–50.3)
HGB BLD-MCNC: 15.8 G/DL (ref 12.1–17)
HGB UR QL STRIP: ABNORMAL
IMM GRANULOCYTES # BLD AUTO: 0 K/UL (ref 0–0.04)
IMM GRANULOCYTES NFR BLD AUTO: 0 % (ref 0–0.5)
KETONES UR QL STRIP.AUTO: NEGATIVE MG/DL
LEUKOCYTE ESTERASE UR QL STRIP.AUTO: NEGATIVE
LYMPHOCYTES # BLD: 1.5 K/UL (ref 0.8–3.5)
LYMPHOCYTES NFR BLD: 15 % (ref 12–49)
MCH RBC QN AUTO: 29.3 PG (ref 26–34)
MCHC RBC AUTO-ENTMCNC: 35.1 G/DL (ref 30–36.5)
MCV RBC AUTO: 83.5 FL (ref 80–99)
MONOCYTES # BLD: 0.6 K/UL (ref 0–1)
MONOCYTES NFR BLD: 6 % (ref 5–13)
NEUTS SEG # BLD: 7.8 K/UL (ref 1.8–8)
NEUTS SEG NFR BLD: 79 % (ref 32–75)
NITRITE UR QL STRIP.AUTO: NEGATIVE
NRBC # BLD: 0 K/UL (ref 0–0.01)
NRBC BLD-RTO: 0 PER 100 WBC
PH UR STRIP: 8.5 [PH] (ref 5–8)
PLATELET # BLD AUTO: 272 K/UL (ref 150–400)
PMV BLD AUTO: 10.8 FL (ref 8.9–12.9)
POTASSIUM SERPL-SCNC: 3.5 MMOL/L (ref 3.5–5.1)
PROT UR STRIP-MCNC: ABNORMAL MG/DL
RBC # BLD AUTO: 5.39 M/UL (ref 4.1–5.7)
RBC #/AREA URNS HPF: ABNORMAL /HPF (ref 0–5)
SODIUM SERPL-SCNC: 142 MMOL/L (ref 136–145)
SP GR UR REFRACTOMETRY: 1.02 (ref 1–1.03)
UA: UC IF INDICATED,UAUC: ABNORMAL
UROBILINOGEN UR QL STRIP.AUTO: 2 EU/DL (ref 0.2–1)
WBC # BLD AUTO: 10 K/UL (ref 4.1–11.1)
WBC URNS QL MICRO: ABNORMAL /HPF (ref 0–4)

## 2020-11-11 PROCEDURE — 96375 TX/PRO/DX INJ NEW DRUG ADDON: CPT

## 2020-11-11 PROCEDURE — 36415 COLL VENOUS BLD VENIPUNCTURE: CPT

## 2020-11-11 PROCEDURE — 80048 BASIC METABOLIC PNL TOTAL CA: CPT

## 2020-11-11 PROCEDURE — 85025 COMPLETE CBC W/AUTO DIFF WBC: CPT

## 2020-11-11 PROCEDURE — 96374 THER/PROPH/DIAG INJ IV PUSH: CPT

## 2020-11-11 PROCEDURE — 74011250636 HC RX REV CODE- 250/636: Performed by: NURSE PRACTITIONER

## 2020-11-11 PROCEDURE — 81001 URINALYSIS AUTO W/SCOPE: CPT

## 2020-11-11 PROCEDURE — 74176 CT ABD & PELVIS W/O CONTRAST: CPT

## 2020-11-11 PROCEDURE — 99284 EMERGENCY DEPT VISIT MOD MDM: CPT

## 2020-11-11 RX ORDER — SODIUM CHLORIDE 0.9 % (FLUSH) 0.9 %
5-40 SYRINGE (ML) INJECTION AS NEEDED
Status: DISCONTINUED | OUTPATIENT
Start: 2020-11-11 | End: 2020-11-12 | Stop reason: HOSPADM

## 2020-11-11 RX ORDER — IBUPROFEN 800 MG/1
800 TABLET ORAL
Qty: 20 TAB | Refills: 0 | Status: SHIPPED | OUTPATIENT
Start: 2020-11-11 | End: 2020-11-18

## 2020-11-11 RX ORDER — KETOROLAC TROMETHAMINE 30 MG/ML
30 INJECTION, SOLUTION INTRAMUSCULAR; INTRAVENOUS
Status: COMPLETED | OUTPATIENT
Start: 2020-11-11 | End: 2020-11-11

## 2020-11-11 RX ORDER — SODIUM CHLORIDE 0.9 % (FLUSH) 0.9 %
5-40 SYRINGE (ML) INJECTION EVERY 8 HOURS
Status: DISCONTINUED | OUTPATIENT
Start: 2020-11-11 | End: 2020-11-12 | Stop reason: HOSPADM

## 2020-11-11 RX ADMIN — Medication 10 ML: at 21:19

## 2020-11-11 RX ADMIN — KETOROLAC TROMETHAMINE 30 MG: 30 INJECTION, SOLUTION INTRAMUSCULAR; INTRAVENOUS at 21:19

## 2020-11-12 NOTE — ED TRIAGE NOTES
Patient presents to the ED with c/o left sided flank pain that radiates to his abdomen x 1 hour PTA. Reports nausea. Denies vomiting. Pt is anxious, jittery and tearful in triage. Denies a hx of kidney stones.

## 2020-11-12 NOTE — DISCHARGE INSTRUCTIONS
Patient Education        Kidney Stone: Care Instructions  Your Care Instructions     Kidney stones are formed when salts, minerals, and other substances normally found in the urine clump together. They can be as small as grains of sand or, rarely, as large as golf balls. While the stone is traveling through the ureter, which is the tube that carries urine from the kidney to the bladder, you will probably feel pain. The pain may be mild or very severe. You may also have some blood in your urine. As soon as the stone reaches the bladder, any intense pain should go away. If a stone is too large to pass on its own, you may need a medical procedure to help you pass the stone. The doctor has checked you carefully, but problems can develop later. If you notice any problems or new symptoms, get medical treatment right away. Follow-up care is a key part of your treatment and safety. Be sure to make and go to all appointments, and call your doctor if you are having problems. It's also a good idea to know your test results and keep a list of the medicines you take. How can you care for yourself at home? · Drink plenty of fluids, enough so that your urine is light yellow or clear like water. If you have kidney, heart, or liver disease and have to limit fluids, talk with your doctor before you increase the amount of fluids you drink. · Take pain medicines exactly as directed. Call your doctor if you think you are having a problem with your medicine. ? If the doctor gave you a prescription medicine for pain, take it as prescribed. ? If you are not taking a prescription pain medicine, ask your doctor if you can take an over-the-counter medicine. Read and follow all instructions on the label. · Your doctor may ask you to strain your urine so that you can collect your kidney stone when it passes. You can use a kitchen strainer or a tea strainer to catch the stone.  Store it in a plastic bag until you see your doctor again.  Preventing future kidney stones  Some changes in your diet may help prevent kidney stones. Depending on the cause of your stones, your doctor may recommend that you:  · Drink plenty of fluids, enough so that your urine is light yellow or clear like water. If you have kidney, heart, or liver disease and have to limit fluids, talk with your doctor before you increase the amount of fluids you drink. · Limit coffee, tea, and alcohol. Also avoid grapefruit juice. · Do not take more than the recommended daily dose of vitamins C and D.  · Avoid antacids such as Gaviscon, Maalox, Mylanta, or Tums. · Limit the amount of salt (sodium) in your diet. · Eat a balanced diet that is not too high in protein. · Limit foods that are high in a substance called oxalate, which can cause kidney stones. These foods include dark green vegetables, rhubarb, chocolate, wheat bran, nuts, cranberries, and beans. When should you call for help? Call your doctor now or seek immediate medical care if:    · You cannot keep down fluids.     · Your pain gets worse.     · You have a fever or chills.     · You have new or worse pain in your back just below your rib cage (the flank area).     · You have new or more blood in your urine. Watch closely for changes in your health, and be sure to contact your doctor if:    · You do not get better as expected. Where can you learn more? Go to http://www.gray.com/  Enter R592 in the search box to learn more about \"Kidney Stone: Care Instructions. \"  Current as of: April 15, 2020               Content Version: 12.6  © 5090-2399 [employer]. Care instructions adapted under license by [employer] (which disclaims liability or warranty for this information).  If you have questions about a medical condition or this instruction, always ask your healthcare professional. [address] any warranty or liability for your use of this information.
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
## 2020-11-12 NOTE — ED NOTES
Pt in ED w/ complaint of mid to lower L sided back pain that radiates to L flank pain that radiates to mid L sided abd pain that started approx 1 hr PTA. Pt reports nausea w/ his pain. Pt denies any vomiting, dysuria, diarrhea, and constipation. Pt is A&O X 4 and appears to be in no distress. Emergency Department Nursing Plan of Care       The Nursing Plan of Care is developed from the Nursing assessment and Emergency Department Attending provider initial evaluation. The plan of care may be reviewed in the ED Provider note.     The Plan of Care was developed with the following considerations:   Patient / Family readiness to learn indicated by:verbalized understanding  Persons(s) to be included in education: patient and family  Barriers to Learning/Limitations:No    Signed     Renetta Dixon RN    11/11/2020   8:53 PM

## 2021-01-11 ENCOUNTER — HOSPITAL ENCOUNTER (EMERGENCY)
Age: 21
Discharge: ANOTHER HOSPITAL | End: 2021-01-11
Attending: EMERGENCY MEDICINE
Payer: MEDICARE

## 2021-01-11 VITALS
BODY MASS INDEX: 26.37 KG/M2 | WEIGHT: 174 LBS | OXYGEN SATURATION: 100 % | HEIGHT: 68 IN | SYSTOLIC BLOOD PRESSURE: 155 MMHG | TEMPERATURE: 98.9 F | RESPIRATION RATE: 18 BRPM | HEART RATE: 94 BPM | DIASTOLIC BLOOD PRESSURE: 66 MMHG

## 2021-01-11 DIAGNOSIS — F33.9 RECURRENT MAJOR DEPRESSIVE DISORDER, REMISSION STATUS UNSPECIFIED (HCC): Primary | ICD-10-CM

## 2021-01-11 DIAGNOSIS — S51.812A LACERATION OF LEFT FOREARM, INITIAL ENCOUNTER: ICD-10-CM

## 2021-01-11 DIAGNOSIS — F41.1 ANXIETY STATE: ICD-10-CM

## 2021-01-11 DIAGNOSIS — R45.851 SUICIDAL IDEATIONS: ICD-10-CM

## 2021-01-11 DIAGNOSIS — Z72.89 DELIBERATE SELF-CUTTING: ICD-10-CM

## 2021-01-11 LAB
AMPHET UR QL SCN: NEGATIVE
ANION GAP SERPL CALC-SCNC: 8 MMOL/L (ref 5–15)
APAP SERPL-MCNC: <2 UG/ML (ref 10–30)
APPEARANCE UR: ABNORMAL
BACTERIA URNS QL MICRO: NEGATIVE /HPF
BARBITURATES UR QL SCN: NEGATIVE
BASOPHILS # BLD: 0 K/UL (ref 0–0.1)
BASOPHILS NFR BLD: 0 % (ref 0–1)
BENZODIAZ UR QL: NEGATIVE
BILIRUB UR QL: NEGATIVE
BUN SERPL-MCNC: 10 MG/DL (ref 6–20)
BUN/CREAT SERPL: 14 (ref 12–20)
CALCIUM SERPL-MCNC: 9.1 MG/DL (ref 8.5–10.1)
CANNABINOIDS UR QL SCN: NEGATIVE
CHLORIDE SERPL-SCNC: 104 MMOL/L (ref 97–108)
CO2 SERPL-SCNC: 28 MMOL/L (ref 21–32)
COCAINE UR QL SCN: NEGATIVE
COLOR UR: ABNORMAL
COVID-19 RAPID TEST, COVR: NOT DETECTED
CREAT SERPL-MCNC: 0.74 MG/DL (ref 0.7–1.3)
DIFFERENTIAL METHOD BLD: NORMAL
DRUG SCRN COMMENT,DRGCM: NORMAL
EOSINOPHIL # BLD: 0.1 K/UL (ref 0–0.4)
EOSINOPHIL NFR BLD: 1 % (ref 0–7)
EPITH CASTS URNS QL MICRO: ABNORMAL /LPF
ERYTHROCYTE [DISTWIDTH] IN BLOOD BY AUTOMATED COUNT: 12.7 % (ref 11.5–14.5)
ETHANOL SERPL-MCNC: <10 MG/DL
GLUCOSE SERPL-MCNC: 103 MG/DL (ref 65–100)
GLUCOSE UR STRIP.AUTO-MCNC: NEGATIVE MG/DL
HCT VFR BLD AUTO: 45 % (ref 36.6–50.3)
HGB BLD-MCNC: 15.3 G/DL (ref 12.1–17)
HGB UR QL STRIP: ABNORMAL
IMM GRANULOCYTES # BLD AUTO: 0 K/UL (ref 0–0.04)
IMM GRANULOCYTES NFR BLD AUTO: 0 % (ref 0–0.5)
KETONES UR QL STRIP.AUTO: NEGATIVE MG/DL
LEUKOCYTE ESTERASE UR QL STRIP.AUTO: NEGATIVE
LYMPHOCYTES # BLD: 2.1 K/UL (ref 0.8–3.5)
LYMPHOCYTES NFR BLD: 30 % (ref 12–49)
MCH RBC QN AUTO: 28.8 PG (ref 26–34)
MCHC RBC AUTO-ENTMCNC: 34 G/DL (ref 30–36.5)
MCV RBC AUTO: 84.6 FL (ref 80–99)
METHADONE UR QL: NEGATIVE
MONOCYTES # BLD: 0.6 K/UL (ref 0–1)
MONOCYTES NFR BLD: 8 % (ref 5–13)
NEUTS SEG # BLD: 4.2 K/UL (ref 1.8–8)
NEUTS SEG NFR BLD: 61 % (ref 32–75)
NITRITE UR QL STRIP.AUTO: NEGATIVE
NRBC # BLD: 0 K/UL (ref 0–0.01)
NRBC BLD-RTO: 0 PER 100 WBC
OPIATES UR QL: NEGATIVE
PCP UR QL: NEGATIVE
PH UR STRIP: 7 [PH] (ref 5–8)
PLATELET # BLD AUTO: 288 K/UL (ref 150–400)
PMV BLD AUTO: 10.1 FL (ref 8.9–12.9)
POTASSIUM SERPL-SCNC: 3.8 MMOL/L (ref 3.5–5.1)
PROT UR STRIP-MCNC: NEGATIVE MG/DL
RBC # BLD AUTO: 5.32 M/UL (ref 4.1–5.7)
RBC #/AREA URNS HPF: ABNORMAL /HPF (ref 0–5)
SALICYLATES SERPL-MCNC: <1.7 MG/DL (ref 2.8–20)
SARS-COV-2, COV2: NOT DETECTED
SODIUM SERPL-SCNC: 140 MMOL/L (ref 136–145)
SOURCE, COVRS: NORMAL
SP GR UR REFRACTOMETRY: 1.02 (ref 1–1.03)
SPECIMEN SOURCE, FCOV2M: NORMAL
SPECIMEN SOURCE, FCOV2M: NORMAL
SPECIMEN TYPE, XMCV1T: NORMAL
UA: UC IF INDICATED,UAUC: ABNORMAL
UROBILINOGEN UR QL STRIP.AUTO: 1 EU/DL (ref 0.2–1)
WBC # BLD AUTO: 6.9 K/UL (ref 4.1–11.1)
WBC URNS QL MICRO: ABNORMAL /HPF (ref 0–4)

## 2021-01-11 PROCEDURE — 81001 URINALYSIS AUTO W/SCOPE: CPT

## 2021-01-11 PROCEDURE — 99285 EMERGENCY DEPT VISIT HI MDM: CPT

## 2021-01-11 PROCEDURE — 90715 TDAP VACCINE 7 YRS/> IM: CPT | Performed by: EMERGENCY MEDICINE

## 2021-01-11 PROCEDURE — 85025 COMPLETE CBC W/AUTO DIFF WBC: CPT

## 2021-01-11 PROCEDURE — 90471 IMMUNIZATION ADMIN: CPT

## 2021-01-11 PROCEDURE — 80143 DRUG ASSAY ACETAMINOPHEN: CPT

## 2021-01-11 PROCEDURE — 80307 DRUG TEST PRSMV CHEM ANLYZR: CPT

## 2021-01-11 PROCEDURE — 74011000250 HC RX REV CODE- 250: Performed by: EMERGENCY MEDICINE

## 2021-01-11 PROCEDURE — 80179 DRUG ASSAY SALICYLATE: CPT

## 2021-01-11 PROCEDURE — 80048 BASIC METABOLIC PNL TOTAL CA: CPT

## 2021-01-11 PROCEDURE — U0003 INFECTIOUS AGENT DETECTION BY NUCLEIC ACID (DNA OR RNA); SEVERE ACUTE RESPIRATORY SYNDROME CORONAVIRUS 2 (SARS-COV-2) (CORONAVIRUS DISEASE [COVID-19]), AMPLIFIED PROBE TECHNIQUE, MAKING USE OF HIGH THROUGHPUT TECHNOLOGIES AS DESCRIBED BY CMS-2020-01-R: HCPCS

## 2021-01-11 PROCEDURE — 74011250637 HC RX REV CODE- 250/637: Performed by: EMERGENCY MEDICINE

## 2021-01-11 PROCEDURE — 87635 SARS-COV-2 COVID-19 AMP PRB: CPT

## 2021-01-11 PROCEDURE — 36415 COLL VENOUS BLD VENIPUNCTURE: CPT

## 2021-01-11 PROCEDURE — 82077 ASSAY SPEC XCP UR&BREATH IA: CPT

## 2021-01-11 PROCEDURE — 75810000293 HC SIMP/SUPERF WND  RPR

## 2021-01-11 PROCEDURE — 74011250636 HC RX REV CODE- 250/636: Performed by: EMERGENCY MEDICINE

## 2021-01-11 RX ORDER — HYDROXYZINE 25 MG/1
50 TABLET, FILM COATED ORAL
Status: COMPLETED | OUTPATIENT
Start: 2021-01-11 | End: 2021-01-11

## 2021-01-11 RX ORDER — LIDOCAINE HYDROCHLORIDE AND EPINEPHRINE 20; 10 MG/ML; UG/ML
1.5 INJECTION, SOLUTION INFILTRATION; PERINEURAL
Status: COMPLETED | OUTPATIENT
Start: 2021-01-11 | End: 2021-01-11

## 2021-01-11 RX ORDER — LORAZEPAM 1 MG/1
1 TABLET ORAL
Status: COMPLETED | OUTPATIENT
Start: 2021-01-11 | End: 2021-01-11

## 2021-01-11 RX ORDER — FLUOXETINE 10 MG/1
CAPSULE ORAL
Status: ON HOLD | COMMUNITY
Start: 2020-12-16 | End: 2021-01-15

## 2021-01-11 RX ORDER — BACITRACIN 500 UNIT/G
1 PACKET (EA) TOPICAL
Status: COMPLETED | OUTPATIENT
Start: 2021-01-11 | End: 2021-01-11

## 2021-01-11 RX ADMIN — BACITRACIN 1 PACKET: 500 OINTMENT TOPICAL at 04:19

## 2021-01-11 RX ADMIN — TETANUS TOXOID, REDUCED DIPHTHERIA TOXOID AND ACELLULAR PERTUSSIS VACCINE, ADSORBED 0.5 ML: 5; 2.5; 8; 8; 2.5 SUSPENSION INTRAMUSCULAR at 04:19

## 2021-01-11 RX ADMIN — LORAZEPAM 1 MG: 1 TABLET ORAL at 04:19

## 2021-01-11 RX ADMIN — HYDROXYZINE HYDROCHLORIDE 50 MG: 25 TABLET, FILM COATED ORAL at 16:14

## 2021-01-11 RX ADMIN — LIDOCAINE HYDROCHLORIDE AND EPINEPHRINE 30 MG: 20; 10 INJECTION, SOLUTION INFILTRATION; PERINEURAL at 03:47

## 2021-01-11 NOTE — ED NOTES
Pt reports feeling anxious, asked provider for medications.  Pt administered medications and ambulated to the restroom
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
## 2021-01-11 NOTE — ED NOTES
Bedside and Verbal shift change report given to [address] [place] [address] Street (oncoming nurse) by Jose De Jesus Guevara (offgoing nurse). Report included the following information SBAR, Kardex, ED Summary, [facility] and Recent Results.

## 2021-01-11 NOTE — ED NOTES
Spoke with Ab from [facility] who advised patient will stay voluntarily, but does meet criteria for TDO, however is now agreeing to stay voluntarily. This writer asked if he would begin bed search and he advised they don't do it BSMART does and BSMART advised they don't. BSMART is calling their supervisor to speak with [facility] and will update us on who is completing the bed search.
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
## 2021-01-11 NOTE — BSMART NOTE
Patient has been accepted by Dr Uri Elliott to [facility]) [address] Bed 1. Report can be called at [phone].

## 2021-01-11 NOTE — ED NOTES
Pt reports feeling much better and is resting comfortably in bed at this time. Meal tray ordered for patient. Patients family dropped off clothing for patient and labeled it in a bag. Items remain in the bag they came in, in nurses station.

## 2021-01-11 NOTE — ED TRIAGE NOTES
Pt arrives with self-inflicted laceration to left forearm that occurred 30min PTA, he used a knife. Reports SI and HI towards certain people stating \"it is a long story\". No specific plan to kill himself. Non-compliant with home meds. Bleeding controlled without pressure.

## 2021-01-11 NOTE — ED NOTES
Pt left with [facility] with all paperwork, 5 bags of belongings and his cell phone. [facility] aware they are enroute.

## 2021-01-11 NOTE — ED NOTES
TRANSFER - OUT REPORT:    Verbal report given to Jaqueline Gerber RN (name) on Deven Draft  being transferred to [address] [place] [address] (unit) for routine progression of care       Report consisted of patients Situation, Background, Assessment and   Recommendations(SBAR). Information from the following report(s) SBAR, Kardex, ED Summary and MAR was reviewed with the receiving nurse. Lines:       Opportunity for questions and clarification was provided. Patient transported with:   Ambulance with all belongings.

## 2021-01-11 NOTE — BSMART NOTE
Per bed board, patient will be presented to Dr Filipe Bae for admission. [facility] Dr Filipe Bae reviews case.

## 2021-01-11 NOTE — ED NOTES
On the phone with Ab with [facility], to set up consult with patient.

## 2021-01-11 NOTE — ED NOTES
Per Duane, patient refusing voluntary admission will contact [facility].

## 2021-01-12 PROBLEM — F32.A DEPRESSION: Status: ACTIVE | Noted: 2021-01-12

## 2021-01-12 PROBLEM — F32.9 MDD (MAJOR DEPRESSIVE DISORDER): Status: RESOLVED | Noted: 2017-07-07 | Resolved: 2021-01-12

## 2021-01-12 PROBLEM — F33.2 MAJOR DEPRESSIVE DISORDER, RECURRENT EPISODE, SEVERE (HCC): Status: ACTIVE | Noted: 2021-01-12

## 2021-01-12 PROBLEM — S41.112A LACERATION OF LEFT UPPER EXTREMITY: Status: ACTIVE | Noted: 2021-01-12

## 2021-02-21 ENCOUNTER — HOSPITAL ENCOUNTER (INPATIENT)
Age: 21
LOS: 4 days | Discharge: HOME | DRG: 885 | End: 2021-02-25
Attending: EMERGENCY MEDICINE | Admitting: PSYCHIATRY & NEUROLOGY
Payer: MEDICARE

## 2021-02-21 DIAGNOSIS — R45.851 SUICIDAL IDEATION: ICD-10-CM

## 2021-02-21 DIAGNOSIS — Z72.89 DELIBERATE SELF-CUTTING: ICD-10-CM

## 2021-02-21 DIAGNOSIS — T65.92XA SUICIDE ATTEMPT BY SUBSTANCE OVERDOSE, INITIAL ENCOUNTER (HCC): Primary | ICD-10-CM

## 2021-02-21 PROBLEM — F32.9 MAJOR DEPRESSION: Status: ACTIVE | Noted: 2021-02-21

## 2021-02-21 LAB
ALBUMIN SERPL-MCNC: 3.8 G/DL (ref 3.5–5)
ALBUMIN/GLOB SERPL: 1.2 {RATIO} (ref 1.1–2.2)
ALP SERPL-CCNC: 59 U/L (ref 45–117)
ALT SERPL-CCNC: 29 U/L (ref 12–78)
AMPHET UR QL SCN: NEGATIVE
ANION GAP SERPL CALC-SCNC: 6 MMOL/L (ref 5–15)
APAP SERPL-MCNC: <2 UG/ML (ref 10–30)
APPEARANCE UR: ABNORMAL
AST SERPL-CCNC: 14 U/L (ref 15–37)
BACTERIA URNS QL MICRO: NEGATIVE /HPF
BARBITURATES UR QL SCN: NEGATIVE
BASOPHILS # BLD: 0 K/UL (ref 0–0.1)
BASOPHILS NFR BLD: 1 % (ref 0–1)
BENZODIAZ UR QL: NEGATIVE
BILIRUB SERPL-MCNC: 0.2 MG/DL (ref 0.2–1)
BILIRUB UR QL: NEGATIVE
BUN SERPL-MCNC: 11 MG/DL (ref 6–20)
BUN/CREAT SERPL: 11 (ref 12–20)
CALCIUM SERPL-MCNC: 8.9 MG/DL (ref 8.5–10.1)
CANNABINOIDS UR QL SCN: NEGATIVE
CHLORIDE SERPL-SCNC: 104 MMOL/L (ref 97–108)
CO2 SERPL-SCNC: 28 MMOL/L (ref 21–32)
COCAINE UR QL SCN: NEGATIVE
COLOR UR: ABNORMAL
COVID-19 RAPID TEST, COVR: NOT DETECTED
CREAT SERPL-MCNC: 0.98 MG/DL (ref 0.7–1.3)
DIFFERENTIAL METHOD BLD: NORMAL
DRUG SCRN COMMENT,DRGCM: NORMAL
EOSINOPHIL # BLD: 0.1 K/UL (ref 0–0.4)
EOSINOPHIL NFR BLD: 2 % (ref 0–7)
EPITH CASTS URNS QL MICRO: ABNORMAL /LPF
ERYTHROCYTE [DISTWIDTH] IN BLOOD BY AUTOMATED COUNT: 12.4 % (ref 11.5–14.5)
ETHANOL SERPL-MCNC: <10 MG/DL
GLOBULIN SER CALC-MCNC: 3.2 G/DL (ref 2–4)
GLUCOSE SERPL-MCNC: 125 MG/DL (ref 65–100)
GLUCOSE UR STRIP.AUTO-MCNC: NEGATIVE MG/DL
HCT VFR BLD AUTO: 41.7 % (ref 36.6–50.3)
HGB BLD-MCNC: 14.3 G/DL (ref 12.1–17)
HGB UR QL STRIP: NEGATIVE
IMM GRANULOCYTES # BLD AUTO: 0 K/UL (ref 0–0.04)
IMM GRANULOCYTES NFR BLD AUTO: 0 % (ref 0–0.5)
KETONES UR QL STRIP.AUTO: NEGATIVE MG/DL
LEUKOCYTE ESTERASE UR QL STRIP.AUTO: NEGATIVE
LYMPHOCYTES # BLD: 2.2 K/UL (ref 0.8–3.5)
LYMPHOCYTES NFR BLD: 29 % (ref 12–49)
MCH RBC QN AUTO: 29 PG (ref 26–34)
MCHC RBC AUTO-ENTMCNC: 34.3 G/DL (ref 30–36.5)
MCV RBC AUTO: 84.6 FL (ref 80–99)
METHADONE UR QL: NEGATIVE
MONOCYTES # BLD: 0.6 K/UL (ref 0–1)
MONOCYTES NFR BLD: 8 % (ref 5–13)
NEUTS SEG # BLD: 4.6 K/UL (ref 1.8–8)
NEUTS SEG NFR BLD: 60 % (ref 32–75)
NITRITE UR QL STRIP.AUTO: NEGATIVE
NRBC # BLD: 0 K/UL (ref 0–0.01)
NRBC BLD-RTO: 0 PER 100 WBC
OPIATES UR QL: NEGATIVE
PCP UR QL: NEGATIVE
PH UR STRIP: 7 [PH] (ref 5–8)
PLATELET # BLD AUTO: 270 K/UL (ref 150–400)
PMV BLD AUTO: 10.5 FL (ref 8.9–12.9)
POTASSIUM SERPL-SCNC: 3.7 MMOL/L (ref 3.5–5.1)
PROT SERPL-MCNC: 7 G/DL (ref 6.4–8.2)
PROT UR STRIP-MCNC: NEGATIVE MG/DL
RBC # BLD AUTO: 4.93 M/UL (ref 4.1–5.7)
RBC #/AREA URNS HPF: ABNORMAL /HPF (ref 0–5)
SALICYLATES SERPL-MCNC: <1.7 MG/DL (ref 2.8–20)
SARS-COV-2, COV2: NORMAL
SARS-COV-2, COV2: NOT DETECTED
SODIUM SERPL-SCNC: 138 MMOL/L (ref 136–145)
SOURCE, COVRS: NORMAL
SP GR UR REFRACTOMETRY: 1.02 (ref 1–1.03)
SPECIMEN SOURCE, FCOV2M: NORMAL
UA: UC IF INDICATED,UAUC: ABNORMAL
UROBILINOGEN UR QL STRIP.AUTO: 1 EU/DL (ref 0.2–1)
WBC # BLD AUTO: 7.5 K/UL (ref 4.1–11.1)
WBC URNS QL MICRO: ABNORMAL /HPF (ref 0–4)

## 2021-02-21 PROCEDURE — 80053 COMPREHEN METABOLIC PANEL: CPT

## 2021-02-21 PROCEDURE — 80179 DRUG ASSAY SALICYLATE: CPT

## 2021-02-21 PROCEDURE — U0003 INFECTIOUS AGENT DETECTION BY NUCLEIC ACID (DNA OR RNA); SEVERE ACUTE RESPIRATORY SYNDROME CORONAVIRUS 2 (SARS-COV-2) (CORONAVIRUS DISEASE [COVID-19]), AMPLIFIED PROBE TECHNIQUE, MAKING USE OF HIGH THROUGHPUT TECHNOLOGIES AS DESCRIBED BY CMS-2020-01-R: HCPCS

## 2021-02-21 PROCEDURE — 85025 COMPLETE CBC W/AUTO DIFF WBC: CPT

## 2021-02-21 PROCEDURE — 96374 THER/PROPH/DIAG INJ IV PUSH: CPT

## 2021-02-21 PROCEDURE — 81001 URINALYSIS AUTO W/SCOPE: CPT

## 2021-02-21 PROCEDURE — 99285 EMERGENCY DEPT VISIT HI MDM: CPT

## 2021-02-21 PROCEDURE — 74011250636 HC RX REV CODE- 250/636: Performed by: EMERGENCY MEDICINE

## 2021-02-21 PROCEDURE — 36415 COLL VENOUS BLD VENIPUNCTURE: CPT

## 2021-02-21 PROCEDURE — 80307 DRUG TEST PRSMV CHEM ANLYZR: CPT

## 2021-02-21 PROCEDURE — 93005 ELECTROCARDIOGRAM TRACING: CPT

## 2021-02-21 PROCEDURE — 80143 DRUG ASSAY ACETAMINOPHEN: CPT

## 2021-02-21 PROCEDURE — 87635 SARS-COV-2 COVID-19 AMP PRB: CPT

## 2021-02-21 PROCEDURE — 65220000003 HC RM SEMIPRIVATE PSYCH

## 2021-02-21 PROCEDURE — 82077 ASSAY SPEC XCP UR&BREATH IA: CPT

## 2021-02-21 PROCEDURE — 74011250637 HC RX REV CODE- 250/637: Performed by: PSYCHIATRY & NEUROLOGY

## 2021-02-21 RX ORDER — HALOPERIDOL 5 MG/ML
5 INJECTION INTRAMUSCULAR
Status: DISCONTINUED | OUTPATIENT
Start: 2021-02-21 | End: 2021-02-25 | Stop reason: HOSPADM

## 2021-02-21 RX ORDER — ADHESIVE BANDAGE
30 BANDAGE TOPICAL DAILY PRN
Status: DISCONTINUED | OUTPATIENT
Start: 2021-02-21 | End: 2021-02-25 | Stop reason: HOSPADM

## 2021-02-21 RX ORDER — LORAZEPAM 2 MG/ML
1 INJECTION INTRAMUSCULAR
Status: DISCONTINUED | OUTPATIENT
Start: 2021-02-21 | End: 2021-02-25 | Stop reason: HOSPADM

## 2021-02-21 RX ORDER — TRAZODONE HYDROCHLORIDE 50 MG/1
50 TABLET ORAL
Status: DISCONTINUED | OUTPATIENT
Start: 2021-02-21 | End: 2021-02-25 | Stop reason: HOSPADM

## 2021-02-21 RX ORDER — OLANZAPINE 5 MG/1
5 TABLET ORAL
Status: DISCONTINUED | OUTPATIENT
Start: 2021-02-21 | End: 2021-02-25 | Stop reason: HOSPADM

## 2021-02-21 RX ORDER — HYDROXYZINE 25 MG/1
50 TABLET, FILM COATED ORAL
Status: DISCONTINUED | OUTPATIENT
Start: 2021-02-21 | End: 2021-02-25 | Stop reason: HOSPADM

## 2021-02-21 RX ORDER — BENZTROPINE MESYLATE 1 MG/1
1 TABLET ORAL
Status: DISCONTINUED | OUTPATIENT
Start: 2021-02-21 | End: 2021-02-25 | Stop reason: HOSPADM

## 2021-02-21 RX ORDER — NALOXONE HYDROCHLORIDE 1 MG/ML
1 INJECTION INTRAMUSCULAR; INTRAVENOUS; SUBCUTANEOUS
Status: COMPLETED | OUTPATIENT
Start: 2021-02-21 | End: 2021-02-21

## 2021-02-21 RX ORDER — DIPHENHYDRAMINE HYDROCHLORIDE 50 MG/ML
50 INJECTION, SOLUTION INTRAMUSCULAR; INTRAVENOUS
Status: DISCONTINUED | OUTPATIENT
Start: 2021-02-21 | End: 2021-02-25 | Stop reason: HOSPADM

## 2021-02-21 RX ORDER — ACETAMINOPHEN 325 MG/1
650 TABLET ORAL
Status: DISCONTINUED | OUTPATIENT
Start: 2021-02-21 | End: 2021-02-25 | Stop reason: HOSPADM

## 2021-02-21 RX ADMIN — NALOXONE HYDROCHLORIDE 1 MG: 1 INJECTION PARENTERAL at 03:27

## 2021-02-21 RX ADMIN — TRAZODONE HYDROCHLORIDE 50 MG: 50 TABLET ORAL at 20:49

## 2021-02-21 RX ADMIN — HYDROXYZINE HYDROCHLORIDE 50 MG: 25 TABLET, FILM COATED ORAL at 20:50

## 2021-02-21 NOTE — ED NOTES
Resting quietly on stretcher. Sinus aleksandra on cardiac monitor. Patient stated that he has previously been told that he has a slow heart rate other VSS. Skin warm and dry. resp unlabored. Will continue to monitor.
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
## 2021-02-21 NOTE — BSMART NOTE
Pt admitted to [facility] by GREGORIO Kennedy for Dr. Gardenia Denise to room 310-1. Nursing report [phone]. Nursing staff/Mariya notified of admission.

## 2021-02-21 NOTE — ED NOTES
Spoke with Joi Staples with [facility]. She was updated about patient's reported ingestion of 8 x 0.2 mg clonidine pills @ 0140. Pt is at risk for CNS depression, myosis, respiratory depression, and bradycardia. She recommends:    Labs: CMP, LFT, drug screen, tylenol and salicylate levels  Monitorin hours after ingestion, EKG, Cardiac/respiratory monitoring. She says that it is symptomatic supportive. IVF OK if hypotensive. Naloxone for bradycardia and/or respiratory depression.

## 2021-02-21 NOTE — ED NOTES
TRANSFER - OUT REPORT:    Coco Mcallister report given to BRENNON RN (name) on Herrera Veag  being transferred to [place] [address] ([facility]) for routine progression of care       Report consisted of patients Situation, Background, Assessment and   Recommendations(SBAR). Information from the following report(s) SBAR, Kardex, ED Summary, Intake/Output, MAR and Recent Results was reviewed with the receiving nurse. Lines:   Peripheral IV 02/21/21 Right Antecubital (Active)   Site Assessment Clean, dry, & intact 02/21/21 0322   Phlebitis Assessment 0 02/21/21 0322   Infiltration Assessment 0 02/21/21 0322   Dressing Status Clean, dry, & intact 02/21/21 0322   Dressing Type Tape;Transparent 02/21/21 0322   Hub Color/Line Status Pink;Patent; Flushed 02/21/21 0322   Action Taken Blood drawn 02/21/21 6339        Opportunity for questions and clarification was provided.       Patient transported with:   Kan Mariscal
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
## 2021-02-21 NOTE — ED NOTES
Verbal shift change report given to AERIAL RN  (oncoming nurse) by Tootie Cunningham RN  (offgoing nurse). Report included the following information SBAR, Kardex, ED Summary, Intake/Output, MAR and Recent Results. BSMART at bedside at this time.

## 2021-02-21 NOTE — BSMART NOTE
BSMART notified of pt's assessment. Pt arrived to ED via EMS for intention OD of Per pt's attending RN, Sirisha, pt under Poison Control observation until 08:40 (6 hrs from POA). Writer will attempt to assess pt at 07:00, closer to time of medical clearance, to allow time to complete MH evaluation and to initiate IP admission process prior to 08:00 shift change.

## 2021-02-21 NOTE — ED NOTES
Spoke to Urbano Roa with poison control, updates provided. Poison control reports they are closing his case at this time.

## 2021-02-21 NOTE — BSMART NOTE
Writer spoke with ACCESS regarding pt's need for IP admission. Pt will be reviewed and presented for admission following Poison Control's recommendation for 6 hours of observation. BSMART will complete assessment to be presented to on call psychiatrist following pt's full observation time and medical clearance by attending physician.

## 2021-02-21 NOTE — ED NOTES
Spoke with [facility] with BSMART. She says he will need a work up for medical clearance. We will call her back when we have a better idea of what the care of the patient will involve. She says to contact her at [phone].
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
## 2021-02-21 NOTE — PROGRESS NOTES
TRANSFER - IN REPORT:    Verbal report received from GERARDO DE LA O on Londa Hamman  being received from ED for routine progression of care      Report consisted of patients Situation, Background, Assessment and   Recommendations(SBAR). Information from the following report(s) SBAR was reviewed with the receiving nurse. Opportunity for questions and clarification was provided. Assessment completed upon patients arrival to unit and care assumed.
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
## 2021-02-22 LAB
ATRIAL RATE: 67 BPM
CALCULATED P AXIS, ECG09: 51 DEGREES
CALCULATED R AXIS, ECG10: 23 DEGREES
CALCULATED T AXIS, ECG11: 2 DEGREES
DIAGNOSIS, 93000: NORMAL
P-R INTERVAL, ECG05: 154 MS
Q-T INTERVAL, ECG07: 374 MS
QRS DURATION, ECG06: 96 MS
QTC CALCULATION (BEZET), ECG08: 395 MS
VENTRICULAR RATE, ECG03: 67 BPM

## 2021-02-22 PROCEDURE — 65220000003 HC RM SEMIPRIVATE PSYCH

## 2021-02-22 PROCEDURE — 74011250637 HC RX REV CODE- 250/637: Performed by: PSYCHIATRY & NEUROLOGY

## 2021-02-22 RX ORDER — FLUOXETINE HYDROCHLORIDE 20 MG/1
60 CAPSULE ORAL DAILY
Status: DISCONTINUED | OUTPATIENT
Start: 2021-02-22 | End: 2021-02-25 | Stop reason: HOSPADM

## 2021-02-22 RX ORDER — BUSPIRONE HYDROCHLORIDE 10 MG/1
20 TABLET ORAL 2 TIMES DAILY
Status: DISCONTINUED | OUTPATIENT
Start: 2021-02-22 | End: 2021-02-25 | Stop reason: HOSPADM

## 2021-02-22 RX ORDER — CLONIDINE HYDROCHLORIDE 0.1 MG/1
0.2 TABLET ORAL
Status: DISCONTINUED | OUTPATIENT
Start: 2021-02-22 | End: 2021-02-25 | Stop reason: HOSPADM

## 2021-02-22 RX ADMIN — HYDROXYZINE HYDROCHLORIDE 50 MG: 25 TABLET, FILM COATED ORAL at 14:24

## 2021-02-22 RX ADMIN — BUSPIRONE HYDROCHLORIDE 20 MG: 10 TABLET ORAL at 17:33

## 2021-02-22 RX ADMIN — TRAZODONE HYDROCHLORIDE 50 MG: 50 TABLET ORAL at 21:48

## 2021-02-22 RX ADMIN — FLUOXETINE 60 MG: 20 CAPSULE ORAL at 14:24

## 2021-02-22 RX ADMIN — HYDROXYZINE HYDROCHLORIDE 50 MG: 25 TABLET, FILM COATED ORAL at 21:08

## 2021-02-22 NOTE — PROGRESS NOTES
2030 - received pt lying in bed in his room w/ lights off. Asked pt if this writer could turn lights on and speak with him for a few min. He agreed. Pt was very flat w/ depressed mood. Isolating in room all day - pt even stated he hasn't come out of his room. When asked about his mood he stated that he's just depressed - denies any SI/HI - pt elaborated some w/ head down looking at bed, very somber mood - and stated, \"I just feel bad about what happened now. About what I did. \" Talked w/ pt for several minutes, suggesting some therapeutic ways to process his feelings of regret and remorse. Pt stated understanding and listened well - discussed ways he can move forward to forgive himself and allow the other person time and space to sort through their own feelings about the events that took place. Pt stated understanding and appeared very receptive to applying some of what was discussed to his situation. Mood was slightly improved when this writer left room. Made sure pt got snack and drink - he denies SI/HI/AH/VH. Superficial scars noted to L inner wrist - no s/s infection. Pt denied intent to harm self or end his life - stated he was just angry at the time. 0730 - pt slept soundly throughout the night w/o distress. Slept total of 9 hrs.

## 2021-02-22 NOTE — PROGRESS NOTES
Behavioral Services  Medicare Certification Upon Admission    I certify that this patient's inpatient psychiatric hospital admission is medically necessary for:    [x] (1) Treatment which could reasonably be expected to improve this patient's condition,       [x] (2) Or for diagnostic study;     AND     [x](2) The inpatient psychiatric services are provided while the individual is under the care of a physician and are included in the individualized plan of care.     Estimated length of stay/service  5 - 7    Plan for post-hospital care per case management    Electronically signed by Tiffany Ann MD on 2/22/2021 at 1:28 PM

## 2021-02-22 NOTE — BH NOTES
GROUP THERAPY PROGRESS NOTE    Patient did not participate in Discharge Planning Group.      Sheri Sanz LPC LSATP [facility]

## 2021-02-22 NOTE — PROGRESS NOTES
[facility] Admission Pharmacy Medication Reconciliation     Information obtained from: patient interview, [facility] pharmacy ([phone]), discharge summary from [facility] in January 2021  [address]: Yes    Comments/recommendations:    Medication changes (since last review): None     [facility] reflects medications filled and processed through the patient's insurance, however                this data does NOT capture whether the medication was picked up or is currently being taken by the patient. Total Time Spent: 10 minutes    Patient allergies: Allergies as of 02/21/2021 - Review Complete 02/21/2021   Allergen Reaction Noted    Codeine Rash 04/23/2015    Augmentin [amoxicillin-pot clavulanate] Unknown (comments) 01/27/2014    Codeine Hives 04/13/2011    Other medication Rash 04/13/2011    Zoloft [sertraline] Unknown (comments) 01/27/2014       Prior to Admission Medications   Prescriptions Last Dose Informant Patient Reported? Taking? FLUoxetine (PROzac) 20 mg capsule \"a few days ago\"  No Yes   Sig: Take 3 Caps by mouth daily. busPIRone (BUSPAR) 10 mg tablet \"a few days ago\"  No Yes   Sig: Take 2 Tabs by mouth two (2) times a day. cloNIDine HCL (CATAPRES) 0.2 mg tablet   No Yes   Sig: Take 1 Tab by mouth nightly. Indications: PTSD adjunct   traZODone (DESYREL) 50 mg tablet   No Yes   Sig: Take 1 Tab by mouth nightly as needed for Sleep.       Facility-Administered Medications: None        Thank you,  LAVERN Chin [facility]ist, [address] [place] [address]  Desk: [phone] ([phone]  Pharmacy: [phone] ([id number])

## 2021-02-22 NOTE — BH NOTES
GROUP THERAPY PROGRESS NOTE    Patient did not participate in Coping Skills group.      [facility]

## 2021-02-22 NOTE — BH NOTES
Behavioral Health Treatment Team Note     Patient goal(s) for today: continue taking meds as prescribe, engage in unit activities, participate in hygiene/ADLs, prepare for discharge, follow directions from staff, contact support team  Treatment team focus/goals: continue adjusting meds as needed, discharge planning, update support system    Progress note: Patient presented with poor eye contact, cooperative attitude, depressed mood, flat affect, soft speech, linear thought stream, appropriate content. Patient reports depressed mood, some anxiety, denies SI/HI and reports no thoughts of hurting self. He reports no issues with medications. He is remaining in his room because it is loud in the hallway. Verbal consent obtained to speak with [facility] and patient's brother if needed. Patient reports he was angry when he came in but is now feeling depressed about what he did. Patient admits that he sometimes forgets to take his medicine but not often.      LOS:  1  Expected LOS: 5-7    Insurance info/prescription coverage:  [place] Medicare Part A&B and [address] Medicaid   Date of last family contact:  No GENET  Family requesting physician contact today:  no  Discharge plan:  Return to home  Guns in the home:  no   Outpatient provider(s):  Psychiatrist- Roge Zeng,  is Rajni V[facility]quez at [facility]    Participating treatment team members: RICARDO Worthy, Dr. Hari Phillips MD
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
## 2021-02-22 NOTE — PROGRESS NOTES
Problem: Suicide  Goal: *STG: Remains safe in hospital  Outcome: Progressing Towards Goal     Problem: Patient Education: Go to Patient Education Activity  Goal: Patient/Family Education  Outcome: Progressing Towards Goal
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
## 2021-02-22 NOTE — BH NOTES
Psychiatric Progress Note    Patient: Eleanor Sutton MRN: [id number]  SSN: [id number]    YOB: 2000  Age: 23 y.o. Sex: male      Admit Date: 2/21/2021    LOS: 1 day     Subjective:     Eleanor Sutton  reports feeling better than on admission however is just sad today. Moods are depressed. Denies SI/HI/AH/VH. No aggression or violence. Appropriately interactive but isolates to room and aware. Tolerating medications well. Eating well and sleeping well. Notes thoughts of self harm come only when angry.     Objective:     Vitals:    02/21/21 1100 02/21/21 1120 02/21/21 2033 02/22/21 1018   BP: (!) 93/39 124/64 115/69 (!) 100/46   Pulse: (!) 44 (!) 51 (!) 57 (!) 58   Resp: 16 18 18 16   Temp:  98.4 °F (36.9 °C) 97.9 °F (36.6 °C) 97.7 °F (36.5 °C)   SpO2: 99% 99% 99% 99%   Weight:  78.8 kg (173 lb 11.2 oz)     Height:  5' 7\" (1.702 m)          Mental Status Exam:   Sensorium  oriented to time, place and person   Relations cooperative   Eye Contact appropriate   Appearance:  age appropriate   Speech:  normal volume, non-pressured and soft   Thought Process: goal directed   Thought Content free of delusions and free of hallucinations   Suicidal ideations none   Mood:  depressed   Affect:  constricted and sad   Memory   adequate   Concentration:  adequate   Insight:  good   Judgment:  fair       MEDICATIONS:  Current Facility-Administered Medications   Medication Dose Route Frequency    OLANZapine (ZyPREXA) tablet 5 mg  5 mg Oral Q6H PRN    haloperidol lactate (HALDOL) injection 5 mg  5 mg IntraMUSCular Q6H PRN    benztropine (COGENTIN) tablet 1 mg  1 mg Oral BID PRN    diphenhydrAMINE (BENADRYL) injection 50 mg  50 mg IntraMUSCular BID PRN    hydrOXYzine HCL (ATARAX) tablet 50 mg  50 mg Oral TID PRN    LORazepam (ATIVAN) injection 1 mg  1 mg IntraMUSCular Q4H PRN    traZODone (DESYREL) tablet 50 mg  50 mg Oral QHS PRN    acetaminophen (TYLENOL) tablet 650 mg  650 mg Oral Q4H PRN    magnesium hydroxide (MILK OF MAGNESIA) 400 mg/5 mL oral suspension 30 mL  30 mL Oral DAILY PRN      DISCUSSION:   the risks and benefits of the proposed medication  patient given opportunity to ask questions    Lab/Data Review: All lab results for the last 24 hours reviewed. No results found for this or any previous visit (from the past 24 hour(s)). Assessment:     Active Problems:    Major depression (2/21/2021)        Plan:     Continue current care  Collateral information  Medication modification as appropriate  Disposition planning with social work    I certify that this patient's inpatient psychiatric hospital services furnished since the previous certification were, and continue to be, required for treatment that could reasonably be expected to improve the patient's condition, or for diagnostic study, and that the patient continues to need, on a daily basis, active treatment furnished directly by or requiring the supervision of inpatient psychiatric facility personnel. In addition, the hospital records show that services furnished were intensive treatment services, admission or related services, or equivalent services.   Signed By: Giovanny Rick MD     February 22, 2021

## 2021-02-22 NOTE — PROGRESS NOTES
1100 Pt received in room. Pt denies si/hi/ah/vh. Anxiety=4/10 depression= 4/10. Pt is Aox4. Pt is calm and cooperative, but appears isolative and restricted in bed. Med and meal compliant. Pain level of 0/10. Will continue to monitor for any changes.        1430 PRN attarax given for anxiety

## 2021-02-22 NOTE — GROUP NOTE
LAYNE  GROUP DOCUMENTATION INDIVIDUAL Group Therapy Note Date: 2/22/2021 Group Start Time: 1100 Group End Time: 1200 Group Topic: Topic Group [facility] 3 ACUTE BEHAV [facility] [place], [address] [facility] GROUP DOCUMENTATION GROUP Group Therapy Note Attendees: 4 Attendance: Did not attend Patient's Goal: Interventions/techniques Patricia Zimmerman

## 2021-02-23 PROCEDURE — 74011250637 HC RX REV CODE- 250/637: Performed by: PSYCHIATRY & NEUROLOGY

## 2021-02-23 PROCEDURE — 65220000003 HC RM SEMIPRIVATE PSYCH

## 2021-02-23 RX ADMIN — HYDROXYZINE HYDROCHLORIDE 50 MG: 25 TABLET, FILM COATED ORAL at 21:15

## 2021-02-23 RX ADMIN — BUSPIRONE HYDROCHLORIDE 20 MG: 10 TABLET ORAL at 16:30

## 2021-02-23 RX ADMIN — ACETAMINOPHEN 650 MG: 325 TABLET ORAL at 08:23

## 2021-02-23 RX ADMIN — OLANZAPINE 5 MG: 5 TABLET, FILM COATED ORAL at 16:31

## 2021-02-23 RX ADMIN — BUSPIRONE HYDROCHLORIDE 20 MG: 10 TABLET ORAL at 08:23

## 2021-02-23 RX ADMIN — CLONIDINE HYDROCHLORIDE 0.2 MG: 0.1 TABLET ORAL at 21:14

## 2021-02-23 RX ADMIN — FLUOXETINE 60 MG: 20 CAPSULE ORAL at 08:23

## 2021-02-23 RX ADMIN — TRAZODONE HYDROCHLORIDE 50 MG: 50 TABLET ORAL at 21:15

## 2021-02-23 NOTE — GROUP NOTE
LAYNE  GROUP DOCUMENTATION INDIVIDUAL Group Therapy Note Date: 2/23/2021 Group Start Time: 1000 Group End Time: 1100 Group Topic: Topic Group [facility] [place] [address] ACUTE BEHAV [facility] [place], [address] GROUP DOCUMENTATION GROUP Group Therapy Note Attendees: 8 Attendance: Attended Patient's Goal:  To participate in healthy relationships [employer] game Interventions/techniques: Supported-things pertaining to relationships Follows Directions: Followed directions Interactions: Interacted appropriately Mental Status: Calm and Flat Behavior/appearance: Attentive, Cooperative and Needed prompting Goals Achieved: Able to engage in interactions, Able to listen to others, Discussed coping and Discussed self-esteem issues Additional Notes:   
 
Morelia Diop

## 2021-02-23 NOTE — BH NOTES
Psychiatric Progress Note    Patient: Kristen Roach MRN: [id number]  SSN: [id number]    YOB: 2000  Age: 23 y.o. Sex: male      Admit Date: 2/21/2021    LOS: 2 days     Subjective:     Kristen Roach  reports feeling better than on admission however is just sad today. Moods are depressed. Denies SI/HI/AH/VH. No aggression or violence. Appropriately interactive but isolates to room and aware. Tolerating medications well. Eating well and sleeping well. Notes thoughts of self harm come only when angry. 2/23 - Kristen Roach reports feeling a little better today though still sullen in affect. Moods are depressed. Denies SI/HI/AH/VH. No aggression or violence. Appropriately interactive in day room and aware. Tolerating medications well. Eating and sleeping fairly.       Objective:     Vitals:    02/22/21 1018 02/22/21 2000 02/22/21 2130 02/23/21 0734   BP: (!) 100/46 (!) 110/53 (!) 115/57 122/60   Pulse: (!) 58 60 (!) 57 60   Resp: 16 16  18   Temp: 97.7 °F (36.5 °C) 98.2 °F (36.8 °C)  97.1 °F (36.2 °C)   SpO2: 99% 100%  97%   Weight:       Height:            Mental Status Exam:   Sensorium  oriented to time, place and person   Relations cooperative   Eye Contact appropriate   Appearance:  age appropriate   Speech:  normal volume, non-pressured and soft   Thought Process: goal directed   Thought Content free of delusions and free of hallucinations   Suicidal ideations none   Mood:  depressed   Affect:  constricted and sad   Memory   adequate   Concentration:  adequate   Insight:  good   Judgment:  fair       MEDICATIONS:  Current Facility-Administered Medications   Medication Dose Route Frequency    FLUoxetine (PROzac) capsule 60 mg  60 mg Oral DAILY    cloNIDine HCL (CATAPRES) tablet 0.2 mg  0.2 mg Oral QHS    busPIRone (BUSPAR) tablet 20 mg  20 mg Oral BID    OLANZapine (ZyPREXA) tablet 5 mg  5 mg Oral Q6H PRN    haloperidol lactate (HALDOL) injection 5 mg  5 mg IntraMUSCular Q6H PRN    benztropine (COGENTIN) tablet 1 mg  1 mg Oral BID PRN    diphenhydrAMINE (BENADRYL) injection 50 mg  50 mg IntraMUSCular BID PRN    hydrOXYzine HCL (ATARAX) tablet 50 mg  50 mg Oral TID PRN    LORazepam (ATIVAN) injection 1 mg  1 mg IntraMUSCular Q4H PRN    traZODone (DESYREL) tablet 50 mg  50 mg Oral QHS PRN    acetaminophen (TYLENOL) tablet 650 mg  650 mg Oral Q4H PRN    magnesium hydroxide (MILK OF MAGNESIA) 400 mg/5 mL oral suspension 30 mL  30 mL Oral DAILY PRN      DISCUSSION:   the risks and benefits of the proposed medication  patient given opportunity to ask questions    Lab/Data Review: All lab results for the last 24 hours reviewed. No results found for this or any previous visit (from the past 24 hour(s)). Assessment:     Principal Problem:    Major depressive disorder, recurrent episode, severe ([facility] [address] (1/12/2021)    Active Problems:    Anxiety disorder (7/7/2017)      Overview: Dr. Isabella Lanier      PTSD (post-traumatic stress disorder) (7/7/2017)      Overview: Dr. Isabella Lanier; Intensive-In-Home Services through [facility]. Fibromyalgia (6/26/2018)        Plan:     Continue current care  Collateral information  Clonidine hold parameters  Medication modification as appropriate  Disposition planning with social work    I certify that this patient's inpatient psychiatric hospital services furnished since the previous certification were, and continue to be, required for treatment that could reasonably be expected to improve the patient's condition, or for diagnostic study, and that the patient continues to need, on a daily basis, active treatment furnished directly by or requiring the supervision of inpatient psychiatric facility personnel. In addition, the hospital records show that services furnished were intensive treatment services, admission or related services, or equivalent services.   Signed By: Jeana Tang MD February 23, 2021

## 2021-02-23 NOTE — PROGRESS NOTES
1946: Pt denied SI/HI/AH/VH. PT denied pain. Pt stated he was a little bit anxious. Pt stated he had a little bit of depression. PT asked for a PO medication to help with his anxiety. Writer explained it was too early and we reassess when it was time. Pt did have some scratch marks on his left arm. None were bleeding and they were shallow pink scratch marks, horizontal and about 4-5 inches across. No wound care is needed at this time.     2141: Writer called on call due to pts BP and pulse being low. Writer had concerns regarding the scheduled clonidine. Pt expressed he needed help with sleep more than calming down. On-call approved the clonidine to be held and the pt to receive trazodone instead.   Pt expressed to writer that he only takes clonidine to help calm him down for sleep.     0130: Pt appears asleep, breathing visible. No issues observed at this time.     0300: Pt appears asleep, breathing visible. No issues observed at this time.     0700: Gave off shift report to oncoming nurse.     Pt slept for 7 hours.     Problem: Suicide  Goal: *STG: Remains safe in hospital  Outcome: Progressing Towards Goal  Goal: *STG: Seeks staff when feelings of self harm or harm towards others arise  Outcome: Progressing Towards Goal  Goal: *STG: Attends activities and groups  Outcome: Progressing Towards Goal     Problem: Patient Education: Go to Patient Education Activity  Goal: Patient/Family Education  Outcome: Progressing Towards Goal     Problem: Discharge Planning  Goal: *Discharge to safe environment  Outcome: Progressing Towards Goal  Goal: *Knowledge of medication management  Outcome: Progressing Towards Goal     Problem: Falls - Risk of  Goal: *Absence of Falls  Description: Document Jacquelyn Fall Risk and appropriate interventions in the flowsheet.  Outcome: Progressing Towards Goal  Note: Fall Risk Interventions:            Medication Interventions: Teach patient to arise slowly

## 2021-02-23 NOTE — PROGRESS NOTES
Problem: Suicide  Goal: *STG: Remains safe in hospital  Outcome: Progressing Towards Goal  Goal: *STG: Seeks staff when feelings of self harm or harm towards others arise  Outcome: Progressing Towards Goal  Goal: *STG: Attends activities and groups  Outcome: Progressing Towards Goal  Goal: *STG/LTG: Complies with medication therapy  Outcome: Progressing Towards Goal   [id number] Report received from [place], [address] Patient awake laying in bed. Patient is guarded when answering questions. AAOX4. Speech clear. Resp. Even and unlabored. NAD noted. Skin WNL for race except scratches to Left arm. Patient reports anxiety is a 2 and depression is a 1 at this time. Patient c/o 5/10 HA at this time. Medicated with Tylenol when patient was given scheduled medications. Patient denies SI, HI, AH, and VH.    0930 Patient denies any pain or discomfort at this time. No acute distress noted. No needs, wants, or concerns voiced at this time. 1631 Patient c/o feeling anxious. Medicated with Zyprexa at this time. NAD noted. No other needs, wants, or concerns voiced at this time.

## 2021-02-23 NOTE — BH NOTES
Behavioral Health Treatment Team Note      Patient goal(s) for today: continue taking meds as prescribe, engage in unit activities, participate in hygiene/ADLs, prepare for discharge, follow directions from staff, contact support team  Treatment team focus/goals: continue adjusting meds as needed, discharge planning, update support system     Progress note:  Patient presented with appropriate eye contact, cooperative attitude, depressed mood, flat affect, fluid speech, logical thought process. Patient reports he is having some anxiety and thiks that it's due to wanting to go home. Patient reports he is having more anxiety than depression. Patient denies SI/HI. States that he is doing better in general unit. Patient reports he spoke to brother yesterday and those talks are going ok. Patient encouraged to continue to work on dialogue with brother while attending groups regarding coping skills. SW attempted to contact patient's BABS Ribeiro Lapping but voicemail indicates she is out of office until Monday.  Supervisor is [place] [phone].     LOS:  2                     Expected LOS: 5-7     Insurance info/prescription coverage:  [place] Medicare Part A&B and [address] Medicaid   Date of last family contact:  No GENET  Family requesting physician contact today:  no  Discharge plan:  Return to home  Guns in the home:  no   Outpatient provider(s):  Psychiatrist- Georgette Turner,  is [facility] mental health, [place] (supervisor) [phone]     Participating treatment team members: RICARDO James, Dr. Marcelle Church MD

## 2021-02-23 NOTE — BH NOTES
GROUP THERAPY PROGRESS NOTE    Patient did not participate in Substance abuse/Coping Skills group.      Stan Briggs LPC [facility]ATP [facility]

## 2021-02-24 PROCEDURE — GZHZZZZ GROUP PSYCHOTHERAPY: ICD-10-PCS | Performed by: PSYCHIATRY & NEUROLOGY

## 2021-02-24 PROCEDURE — 65220000003 HC RM SEMIPRIVATE PSYCH

## 2021-02-24 PROCEDURE — 74011250637 HC RX REV CODE- 250/637: Performed by: PSYCHIATRY & NEUROLOGY

## 2021-02-24 RX ADMIN — BUSPIRONE HYDROCHLORIDE 20 MG: 10 TABLET ORAL at 16:59

## 2021-02-24 RX ADMIN — CLONIDINE HYDROCHLORIDE 0.2 MG: 0.1 TABLET ORAL at 22:19

## 2021-02-24 RX ADMIN — FLUOXETINE 60 MG: 20 CAPSULE ORAL at 09:04

## 2021-02-24 RX ADMIN — TRAZODONE HYDROCHLORIDE 50 MG: 50 TABLET ORAL at 22:19

## 2021-02-24 RX ADMIN — HYDROXYZINE HYDROCHLORIDE 50 MG: 25 TABLET, FILM COATED ORAL at 22:19

## 2021-02-24 RX ADMIN — OLANZAPINE 5 MG: 5 TABLET, FILM COATED ORAL at 22:19

## 2021-02-24 RX ADMIN — BUSPIRONE HYDROCHLORIDE 20 MG: 10 TABLET ORAL at 09:04

## 2021-02-24 NOTE — GROUP NOTE
LAYNE  GROUP DOCUMENTATION INDIVIDUAL Group Therapy Note Date: 2/24/2021 Group Start Time: 1500 Group End Time: 0945 Group Topic: Recreational/Music Therapy [facility] 3 ACUTE BEHAV [facility] [place], [address] [facility] GROUP DOCUMENTATION GROUP Group Therapy Note Attendees: 6 Attendance: Attended Patient's Goal:  To concentrate on selected task Interventions/techniques: Supported-crafts,games,music Follows Directions: Followed directions Interactions: Interacted appropriately Mental Status: Calm Behavior/appearance: Attentive, Cooperative and Needed prompting Goals Achieved: Able to engage in interactions and Able to listen to others Additional Notes:  Good concentration on selected task Cris Joya

## 2021-02-24 NOTE — BH NOTES
Behavioral Health Treatment Team Note      Patient goal(s) for today: continue taking meds as prescribe, engage in unit activities, participate in hygiene/ADLs, prepare for discharge, follow directions from staff, contact support team  Treatment team focus/goals: continue adjusting meds as needed, discharge planning, update support system     Progress note:  Patient reports he continues to be anxious because he does not want to be here. He reports no SI/HI, no issues on the unit, no AVH, no medication issues. Patient reports the medication has helped his depression but not his anxiety. He reports he spoke to his brother last night and they apoligized. Patient reports he has [facility] through [facility]. SW attempted to contact Renettaangela Wolf with [facility].  left. She notified SARAVANAN that patient is scheduled with Quinn Hunter on 3/2 at 1:00. SW attempted to contact patient's twin brother via telephone but received no response.  SW left contact information.     ZLW:  0                     BILAIKNT LOS: 5-7     Insurance info/prescription coverage:  [place] Medicare Part A&B and [facility]  Date of last family contact:  [facility] for brother ([phone])  Family requesting physician contact today:  no  Discharge plan:  Return to home  Guns in the home:  no   Outpatient provider(s):  Psychiatrist- Tara Turner,  is [facility] mental health, Renetta Wolf (supervisor) [phone]     Participating treatment team RICARDO Cortez, Dr. Lynn Adair MD

## 2021-02-24 NOTE — PROGRESS NOTES
1930- Report received from previous shift. Assumed care of pt.     2000- Pt received in dayroom, sitting quietly. Pt presents with flat affect, sad mood. No behavior issues noted. Pt denies SI/HI and A/V/H. Pt endorses anxiety, denies depression. Denies pain at this time. VSS. Pt med compliant. PRN trazodone and atarax given per request.     0000- Pt appears asleep, no issues noted. Will continue to monitor. 0400-Pt appears asleep, no issues noted. Will continue to monitor.     Sleep hours- 8 hours

## 2021-02-24 NOTE — BH NOTES
Psychiatric Progress Note    Patient: Debra Guzman MRN: [id number]  SSN: [id number]    YOB: 2000  Age: 23 y.o. Sex: male      Admit Date: 2/21/2021    LOS: 3 days     Subjective:     Debra Guzman  reports feeling better than on admission however is just sad today. Moods are depressed. Denies SI/HI/AH/VH. No aggression or violence. Appropriately interactive but isolates to room and aware. Tolerating medications well. Eating well and sleeping well. Notes thoughts of self harm come only when angry. 2/23 - Debra Guzman reports feeling a little better today though still sullen in affect. Moods are depressed. Denies SI/HI/AH/VH. No aggression or violence. Appropriately interactive in day room and aware. Tolerating medications well. Eating and sleeping fairly. 2/24 - Debra Guzman reports feeling anxious mainly and moods are better. Still presents as sullen. Denies SI/HI/AH/VH. No aggression or violence. Appropriately interactive and aware. Tolerating medications well. Eating and sleeping fairly. Spoke with brother whom he lives with and made amends for their argument. Discharge focused.     Objective:     Vitals:    02/23/21 0734 02/23/21 0813 02/23/21 2114 02/24/21 0847   BP: 122/60  113/65 (!) 112/54   Pulse: 60  64 61   Resp: 18 18 16 18   Temp: 97.1 °F (36.2 °C)  98.3 °F (36.8 °C) 98.1 °F (36.7 °C)   SpO2: 97%   99%   Weight:       Height:            Mental Status Exam:   Sensorium  oriented to time, place and person   Relations cooperative   Eye Contact appropriate   Appearance:  age appropriate   Speech:  normal volume, non-pressured and soft   Thought Process: goal directed   Thought Content free of delusions and free of hallucinations   Suicidal ideations none   Mood:  Anxious   Affect:  Fair range (sullen)   Memory   adequate   Concentration:  adequate   Insight:  good   Judgment:  fair       MEDICATIONS:  Current Facility-Administered Medications   Medication Dose Route Frequency    FLUoxetine (PROzac) capsule 60 mg  60 mg Oral DAILY    cloNIDine HCL (CATAPRES) tablet 0.2 mg  0.2 mg Oral QHS    busPIRone (BUSPAR) tablet 20 mg  20 mg Oral BID    OLANZapine (ZyPREXA) tablet 5 mg  5 mg Oral Q6H PRN    haloperidol lactate (HALDOL) injection 5 mg  5 mg IntraMUSCular Q6H PRN    benztropine (COGENTIN) tablet 1 mg  1 mg Oral BID PRN    diphenhydrAMINE (BENADRYL) injection 50 mg  50 mg IntraMUSCular BID PRN    hydrOXYzine HCL (ATARAX) tablet 50 mg  50 mg Oral TID PRN    LORazepam (ATIVAN) injection 1 mg  1 mg IntraMUSCular Q4H PRN    traZODone (DESYREL) tablet 50 mg  50 mg Oral QHS PRN    acetaminophen (TYLENOL) tablet 650 mg  650 mg Oral Q4H PRN    magnesium hydroxide (MILK OF MAGNESIA) 400 mg/5 mL oral suspension 30 mL  30 mL Oral DAILY PRN      DISCUSSION:   the risks and benefits of the proposed medication  patient given opportunity to ask questions    Lab/Data Review: All lab results for the last 24 hours reviewed. No results found for this or any previous visit (from the past 24 hour(s)). Assessment:     Principal Problem:    Major depressive disorder, recurrent episode, severe ([facility] [address] (1/12/2021)    Active Problems:    Anxiety disorder (7/7/2017)      Overview: Dr. Denia Busch      PTSD (post-traumatic stress disorder) (7/7/2017)      Overview: Dr. Denia Busch; [facility]Home Services through [facility]       and [facility].       Fibromyalgia (6/26/2018)        Plan:     Continue current care  Collateral information  Clonidine hold parameters  Medication modification as appropriate  Disposition planning with social work for tomorrow    I certify that this patient's inpatient psychiatric hospital services furnished since the previous certification were, and continue to be, required for treatment that could reasonably be expected to improve the patient's condition, or for diagnostic study, and that the patient continues to need, on a daily basis, active treatment furnished directly by or requiring the supervision of inpatient psychiatric facility personnel. In addition, the hospital records show that services furnished were intensive treatment services, admission or related services, or equivalent services.   Signed By: Camden Barrow MD     February 24, 2021

## 2021-02-24 NOTE — GROUP NOTE
LAYNE  GROUP DOCUMENTATION INDIVIDUAL Group Therapy Note Date: 2/24/2021 Group Start Time: 1100 Group End Time: 1200 Group Topic: Topic Group [facility] [place] 3 ACUTE BEHAV [facility] [place], [address] [facility] GROUP DOCUMENTATION GROUP Group Therapy Note Attendees: 4 Attendance: Did not attend Patient's Goal: Interventions/techniques:Lizbeth Craft

## 2021-02-24 NOTE — BH NOTES
GROUP THERAPY PROGRESS NOTE    Patient did not participate in Discharge Planning Group.      Qasim SPANN, Intern with  Mellisa Leon LPC [facility]

## 2021-02-24 NOTE — PROGRESS NOTES
Problem: Suicide  Goal: *STG: Remains safe in hospital  Outcome: Progressing Towards Goal  Goal: *STG/LTG: Complies with medication therapy  Outcome: Progressing Towards Goal     Problem: Falls - Risk of  Goal: *Absence of Falls  Description: Document Jacquelyn Fall Risk and appropriate interventions in the flowsheet.   Outcome: Progressing Towards Goal  Note: Fall Risk Interventions:

## 2021-02-24 NOTE — BH NOTES
GROUP THERAPY PROGRESS NOTE    Patient is participating in coping skills group. Group time: 45 minutes    Personal goal for participation: Learn coping skills to reduce negative emotions    Goal orientation: Personal    Group therapy participation: active    Therapeutic interventions reviewed and discussed: Group discussion on why being bored can be a problem and the consequences of boredom that patient have experienced. Patient discussed issues they have had with being bored and ways they have tried to combat boredom. This lead to discussion of coping skills for negative emotions and ways to feel better that each patient has tried or that they have heard of. Discussion of activities that help with boredom, challenges, potential solutions and plans. Impression of participation: Suzanne Canales was present in group. He was active in discussion. He shared that he likes cooking and will find recipes off pinterest. He shared that he especially enjoys cooking with his sister.     Lyric Rushing LPC LSATP [facility]

## 2021-02-24 NOTE — GROUP NOTE
LAYNE  GROUP DOCUMENTATION INDIVIDUAL Group Therapy Note Date: 2/24/2021 Group Start Time: 0900 Group End Time: 1000 Group Topic: Topic Group [address] [facility] [address] 3 ACUTE BEHAV [facility], [address] GROUP DOCUMENTATION GROUP Group Therapy Note Attendees: 7 Attendance: Attended Patient's Goal:  To participate in chair exercise routine Interventions/techniques: Supported-benefits of exercise Follows Directions: Followed directions Interactions: Interacted appropriately Mental Status: Calm and Flat Behavior/appearance: Attentive, Cooperative and Needed prompting Goals Achieved: Able to engage in interactions, Able to listen to others and Discussed coping Additional Notes:   
 
Ariel Baeza

## 2021-02-24 NOTE — PROGRESS NOTES
Problem: Suicide  Goal: *STG: Remains safe in hospital  Outcome: Progressing Towards Goal  Goal: *STG: Attends activities and groups  Outcome: Progressing Towards Goal  Goal: *STG/LTG: No longer expresses self destructive or suicidal thoughts  Outcome: Progressing Towards Goal   [id number] Reported received from off going nurse     5764 Patient in his room laying in bed. Easily aroused when spoken to. AAOX4. Speech clear. Patient has a very flat affect. Patient rates Anxiety 3 and depression 1 at this time. Resp. Even and unlabored. NAD noted. Skin WNL for race except scratches to Left forearm. Patient denies any SI, HI, AH, VH, or pain. Patient reports feeling anxious and waiting to see MD. Explained to patient he needed to take scheduled medications and explained possible PRN medications available. Patient verbalized understanding and reported he would go take his medications. No other needs, wants, or concerns voiced at this time. Will continue to monitor     8462 Patient sitting in the dayroom in group. NAD noted. No needs, wants, or concerns voiced at this time. 1106 Patient laying in bed with eyes closed. NAD noted. No needs, wants, or concerns voiced at this time. 1331 Patient laying in bed with eyes closed. NAD noted. No needs, wants, or concerns voiced at this time. 1722 Patient sitting in the dayroom on the phone. NAD noted. No needs, wants, or concerns voiced at this time.

## 2021-02-25 VITALS
HEIGHT: 67 IN | TEMPERATURE: 98 F | HEART RATE: 60 BPM | DIASTOLIC BLOOD PRESSURE: 69 MMHG | RESPIRATION RATE: 18 BRPM | BODY MASS INDEX: 27.26 KG/M2 | WEIGHT: 173.7 LBS | SYSTOLIC BLOOD PRESSURE: 130 MMHG | OXYGEN SATURATION: 100 %

## 2021-02-25 PROCEDURE — 74011250637 HC RX REV CODE- 250/637: Performed by: PSYCHIATRY & NEUROLOGY

## 2021-02-25 RX ADMIN — BUSPIRONE HYDROCHLORIDE 20 MG: 10 TABLET ORAL at 08:35

## 2021-02-25 RX ADMIN — FLUOXETINE 60 MG: 20 CAPSULE ORAL at 08:35

## 2021-02-25 NOTE — BH NOTES
GROUP THERAPY PROGRESS NOTE    Patient is participating in Coping Skills group. Group time: 45 minutes    Personal goal for participation: To come up with a plan of things to help maintain wellness of the whole person    Goal orientation: Personal    Group therapy participation: active    Therapeutic interventions reviewed and discussed: Group discussion of the wellness wheel and how they plan to balance themselves by looking at spiritual, physical, occupations, intellectual, social/family, and emotional aspects of their lives. Impression of participation: Juan Glynn was present in group. He shared that he works on his social/family aspect by spending time with family. Discussed him cooking with his sister that he shared in group yesterday. He shared that he works on being healthy by trying to get sleep and staying awake during the day so he can sleep at night. He was appropriate in group and although quiet at times he was interactive with other members.     Magdaleno Callahan LPC [facility]ATP [facility]

## 2021-02-25 NOTE — PROGRESS NOTES
The patient was anxious and stating he was afriad of discharge today. He was very flat with a baseline tremor in hands. The patient was given trazodone 50mg, zyprexa 5mg, and Atarax 50mg at 2219. This greatly helped his anxiety and trouble sleeping. The patient denied all pain, SI/HI/AVH, and immediate concerns. He was very flat in affect, but appropriate in answers. He slept a total of 8 hours. The patient had safety maintained and no other issues.

## 2021-02-25 NOTE — PROGRESS NOTES
Problem: Suicide  Goal: *STG: Remains safe in hospital  2/25/2021 1153 by Tremaine Davenport RN  Outcome: Resolved/Met  2/25/2021 0847 by Tremaine Davenport RN  Outcome: Progressing Towards Goal  Goal: *STG: Seeks staff when feelings of self harm or harm towards others arise  2/25/2021 1153 by Tremaine Davenport RN  Outcome: Resolved/Met  2/25/2021 0847 by Tremaine Davenport RN  Outcome: Progressing Towards Goal  Goal: *STG: Attends activities and groups  2/25/2021 1153 by Tremaine Davenport RN  Outcome: Resolved/Met  2/25/2021 0847 by Tremaine Davenport RN  Outcome: Progressing Towards Goal  Goal: *STG:  Verbalizes alternative ways of dealing with maladaptive feelings/behaviors  2/25/2021 1153 by Tremaine Davenport RN  Outcome: Resolved/Met  2/25/2021 0847 by Tremaine Davenport RN  Outcome: Progressing Towards Goal  Goal: *STG/LTG: Complies with medication therapy  2/25/2021 1153 by Tremaine Davenport RN  Outcome: Resolved/Met  2/25/2021 0847 by Tremaine Davenport RN  Outcome: Progressing Towards Goal  Goal: *STG/LTG: No longer expresses self destructive or suicidal thoughts  2/25/2021 1153 by Tremaine Davenport RN  Outcome: Resolved/Met  2/25/2021 0847 by Tremaine Davenport RN  Outcome: Progressing Towards Goal  Goal: *LTG:  Identifies available community resources  2/25/2021 1153 by Tremaine Davenport RN  Outcome: Resolved/Met  2/25/2021 0847 by Tremaine Davenport RN  Outcome: Progressing Towards Goal  Goal: *LTG:  Develops proactive suicide prevention plan  2/25/2021 1153 by Tremaine Davenport RN  Outcome: Resolved/Met  2/25/2021 0847 by Tremaine Davenport RN  Outcome: Progressing Towards Goal  Goal: Interventions  2/25/2021 1153 by Termaine Davenport RN  Outcome: Resolved/Met  2/25/2021 0847 by Tremaine Davenport RN  Outcome: Progressing Towards Goal     Problem: Patient Education: Go to Patient Education Activity  Goal: Patient/Family Education  2/25/2021 1153 by Tremaine Davenport RN  Outcome: Resolved/Met  2/25/2021 4432 by Edd Nash RN  Outcome: Progressing Towards Goal     Problem: Discharge Planning  Goal: *Discharge to safe environment  2/25/2021 1153 by Edd Nash RN  Outcome: Resolved/Met  2/25/2021 0847 by Edd Nash RN  Outcome: Progressing Towards Goal  Goal: *Knowledge of medication management  2/25/2021 1153 by Edd Nash RN  Outcome: Resolved/Met  2/25/2021 0847 by Edd Nash RN  Outcome: Progressing Towards Goal  Goal: *Knowledge of discharge instructions  2/25/2021 1153 by Edd Nash RN  Outcome: Resolved/Met  2/25/2021 0847 by Edd Nash RN  Outcome: Progressing Towards Goal

## 2021-02-25 NOTE — PROGRESS NOTES
Problem: Suicide  Goal: *STG: Remains safe in hospital  Outcome: Progressing Towards Goal  Goal: *STG: Seeks staff when feelings of self harm or harm towards others arise  Outcome: Progressing Towards Goal  Goal: *STG: Attends activities and groups  Outcome: Progressing Towards Goal  Goal: *STG:  Verbalizes alternative ways of dealing with maladaptive feelings/behaviors  Outcome: Progressing Towards Goal  Goal: *STG/LTG: Complies with medication therapy  Outcome: Progressing Towards Goal  Goal: *STG/LTG: No longer expresses self destructive or suicidal thoughts  Outcome: Progressing Towards Goal  Goal: *LTG:  Identifies available community resources  Outcome: Progressing Towards Goal  Goal: *LTG:  Develops proactive suicide prevention plan  Outcome: Progressing Towards Goal  Goal: Interventions  Outcome: Progressing Towards Goal     Problem: Discharge Planning  Goal: *Discharge to safe environment  Outcome: Progressing Towards Goal  Goal: *Knowledge of medication management  Outcome: Progressing Towards Goal  Goal: *Knowledge of discharge instructions  Outcome: Progressing Towards Goal     Problem: Falls - Risk of  Goal: *Absence of Falls  Description: Document Jacquelyn Fall Risk and appropriate interventions in the flowsheet.   Outcome: Progressing Towards Goal  Note: Fall Risk Interventions:            Medication Interventions: Teach patient to arise slowly

## 2021-02-25 NOTE — GROUP NOTE
LAYNE  GROUP DOCUMENTATION INDIVIDUAL Group Therapy Note Date: 2/25/2021 Group Start Time: 0900 Group End Time: 1000 Group Topic: Topic Group [facility] 3 ACUTE BEHAV [facility], [address] GROUP DOCUMENTATION GROUP Group Therapy Note Attendees: 7 Attendance: Attended Patient's Goal:  To participate in anger management [employer] game Interventions/techniques: Supported-things pertaining to anger Follows Directions: Followed directions Interactions: Interacted appropriately Mental Status: Calm Behavior/appearance: Attentive, Cooperative and Needed prompting Goals Achieved: Able to engage in interactions, Able to listen to others, Able to self-disclose, Discussed coping and Identified triggers Additional Notes:   
 
Saunders Sportsman

## 2021-02-25 NOTE — SUICIDE SAFETY PLAN
SAFETY PLAN    A suicide Safety Plan is a document that supports someone when they are having thoughts of suicide. Warning Signs that indicate a suicidal crisis may be developing: What (situations, thoughts, feelings, body sensations, behaviors, etc.) do you experience that lets you know you are beginning to think about suicide? 1. Feeling hopeless  2. isolating  3. Sleeping a lot    Internal Coping Strategies:  What things can I do (relaxation techniques, hobbies, physical activities, etc.) to take my mind off my problems without contacting another person? 1. music  2. Going on walks  3. Breathing techniques    People and social settings that provide distraction: Who can I call or where can I go to distract me? 1. Name: sister  Phone: [phone]  2. Name: juan daniel  Phone:  [phone]  3. Place: [place] [address]            4. Place: [facility] park/trails    People whom I can ask for help: Who can I call when I need help - for example, friends, family, clergy, someone else? 1. Name: sister                Phone: [phone]  2. Name: juan daniel  Phone: [phone]  3. Name: brother  Phone: [phone]    Professionals or [address] [place] [address] I can contact during a crisis: Who can I call for help - for example, my doctor, my psychiatrist, my psychologist, a mental health provider, a suicide hotline? 1. Clinician Name: Bhavani Cheatham   Phone: [phone]      Clinician Pager or Emergency Contact #: [phone]    2. Clinician Name: Dr. Claire Zacarias   Phone: [phone]      Clinician Pager or Emergency Contact #: [phone]    3. Suicide Prevention Lifeline: 6-889-502-TALK (2818)    4.  [address] [place] [address] Emergency Services -  for example, [address] [facility] suicide hotline, [facility] Hotline: [address] [place] [address]      Emergency Services Address: Cari Lutz, [address] [facility]      Emergency Services Phone: [phone]    Making the environment safe: How can I make my environment (house/apartment/living space) safer? For example, can I remove guns, medications, and other items? 1. Remove knives  2.  Keep meds put up

## 2021-02-25 NOTE — DISCHARGE INSTRUCTIONS
Patient Education        Learning About Depression Screening  What is depression screening? Depression screening is a way to see if you have depression symptoms. It may be done by a doctor or counselor. This screening is often part of a routine checkup. That's because your mental health is just as important as your physical health. Depression is a medical illness that affects how you feel, think, and act. You may:  · Have less energy. · Lose interest in your daily activities. · Feel sad and grouchy for a long time. Depression is very common. It affects men and women of all ages. Many things can trigger depression. Some people become depressed after they have a stroke or find out they have a major illness like cancer or heart disease. The death of a loved one, a breakup, or changes in the natural brain chemicals may lead to depression. It can run in families. Most experts believe that a combination of family history (a person's genes) and stressful life events can cause depression. What happens during screening? You may be asked to fill out a form about your depression symptoms. You and the doctor will discuss your answers. The doctor may ask you more questions to learn more about how you think, act, and feel. What happens after screening? If you have signs of depression, your doctor will talk to you about your options. Doctors usually treat depression with medicines or counseling. Often, combining the two works best. Many people don't get help because they think that they'll get over the depression on their own. But people with depression may not get better unless they get treatment. Many people feel embarrassed or ashamed about having depression. But it isn't a sign of personal weakness. It's not a character flaw. A person who is depressed is not \"crazy. \" Depression is caused by changes in the brain. A serious symptom of depression is thinking about death or suicide.  If you or someone you care about talks about this or about feeling hopeless, get help right away. It's important to know that depression can be treated. The first step toward feeling better is often just seeing that the problem exists. Where can you learn more? Go to http://www.gray.com/  Enter T185 in the search box to learn more about \"Learning About Depression Screening. \"  Current as of: 2020               Content Version: 12.6  © 5381-8033 [employer]. Care instructions adapted under license by [employer] (which disclaims liability or warranty for this information). If you have questions about a medical condition or this instruction, always ask your healthcare professional. [place] [address] any warranty or liability for your use of this information. DISCHARGE SUMMARY    NAME:Conor Phillips Che  : 2000  MRN: [id number]    The patient Andrew Brush exhibits the ability to control behavior in a less restrictive environment. Patient's level of functioning is improving. No assaultive/destructive behavior has been observed for the past 24 hours. No suicidal/homicidal threat or behavior has been observed for the past 24 hours. There is no evidence of serious medication side effects. Patient has not been in physical or protective restraints for at least the past 24 hours. If weapons involved, how are they secured? No weapons    Is patient aware of and in agreement with discharge plan? Patient agrees to discharge to home and follow up with outpatient providers    Arrangements for medication:  Prescriptions sent to community pharmacy    Copy of discharge instructions to provider?:  Fax to [address] [place] [address] 70 for transportation home:  Grandmother to transport    Keep all follow up appointments as scheduled, continue to take prescribed medications per physician instructions.   Mental health crisis number:  713 or your local mental health crisis line number at [facility] 116 Emergency WARM LINE      1-398-886-MHAV (5826)      M-F: 9am to 9pm      Sat & Sun: 5pm - 9pm  National suicide prevention lines:                             9-354-OMGZHUD (7-564-968-145-392-3868)       6-621-176-TALK (1-526-854-095-639-1683)   24/7 Crisis Text Line:  Text HOME to 603640

## 2021-02-25 NOTE — BH NOTES
Behavioral Health Treatment Team Note      Patient goal(s) for today: continue taking meds as prescribe, engage in unit activities, participate in hygiene/ADLs, prepare for discharge, follow directions from staff, contact support team  Treatment team focus/goals: continue adjusting meds as needed, discharge planning, update support system     Progress note:  Patient presents with appropriate eye contact, cooperative attitude, zapata affect, congruent mood, logical thought process. He reports that he slept last night but is a little groggy. Patient reports some anxiety but states that it's not too bad. Patient denies SI/HI, AVH. Patient reports feeling a little anxious around people but that's normal. Patient reports continuing to process relationship with brother.  Willing to plan for discharge and safety plan.      XTA:  2                     ZRLFBBUE LOS: 5-7     Insurance info/prescription coverage:  [employer] Medicare Part A&B and [facility]  Date of last family contact:  [facility] for brother ([phone])  Family requesting physician contact today:  no  Discharge plan:  Return to home  Guns in the home:  no   Outpatient provider(s):  Psychiatrist- Damian Parekh,  is [facility] (supervisor) [phone]     Participating treatment team RICADRO Otero, Dr. Jacqui Jacobo MD
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
## 2021-08-03 PROBLEM — F32.A DEPRESSION: Status: RESOLVED | Noted: 2021-01-12 | Resolved: 2021-08-03

## 2021-08-24 ENCOUNTER — HOSPITAL ENCOUNTER (EMERGENCY)
Age: 21
Discharge: HOME | End: 2021-08-24
Attending: EMERGENCY MEDICINE
Payer: MEDICARE

## 2021-08-24 VITALS
SYSTOLIC BLOOD PRESSURE: 122 MMHG | OXYGEN SATURATION: 99 % | HEIGHT: 67 IN | WEIGHT: 170 LBS | HEART RATE: 91 BPM | DIASTOLIC BLOOD PRESSURE: 73 MMHG | BODY MASS INDEX: 26.68 KG/M2 | RESPIRATION RATE: 16 BRPM

## 2021-08-24 DIAGNOSIS — F41.8 ANXIETY ASSOCIATED WITH DEPRESSION: Primary | ICD-10-CM

## 2021-08-24 LAB
ALBUMIN SERPL-MCNC: 4.1 G/DL (ref 3.5–5)
ALBUMIN/GLOB SERPL: 1.2 {RATIO} (ref 1.1–2.2)
ALP SERPL-CCNC: 56 U/L (ref 45–117)
ALT SERPL-CCNC: 44 U/L (ref 12–78)
AMPHET UR QL SCN: NEGATIVE
ANION GAP SERPL CALC-SCNC: 7 MMOL/L (ref 5–15)
APAP SERPL-MCNC: <2 UG/ML (ref 10–30)
APPEARANCE UR: ABNORMAL
AST SERPL-CCNC: 21 U/L (ref 15–37)
BACTERIA URNS QL MICRO: NEGATIVE /HPF
BARBITURATES UR QL SCN: NEGATIVE
BASOPHILS # BLD: 0 K/UL (ref 0–0.1)
BASOPHILS NFR BLD: 0 % (ref 0–1)
BENZODIAZ UR QL: NEGATIVE
BILIRUB SERPL-MCNC: 0.3 MG/DL (ref 0.2–1)
BILIRUB UR QL: NEGATIVE
BUN SERPL-MCNC: 13 MG/DL (ref 6–20)
BUN/CREAT SERPL: 12 (ref 12–20)
CALCIUM SERPL-MCNC: 9.2 MG/DL (ref 8.5–10.1)
CANNABINOIDS UR QL SCN: NEGATIVE
CHLORIDE SERPL-SCNC: 106 MMOL/L (ref 97–108)
CO2 SERPL-SCNC: 30 MMOL/L (ref 21–32)
COCAINE UR QL SCN: NEGATIVE
COLOR UR: ABNORMAL
CREAT SERPL-MCNC: 1.05 MG/DL (ref 0.7–1.3)
DIFFERENTIAL METHOD BLD: NORMAL
DRUG SCRN COMMENT,DRGCM: NORMAL
EOSINOPHIL # BLD: 0.1 K/UL (ref 0–0.4)
EOSINOPHIL NFR BLD: 1 % (ref 0–7)
EPITH CASTS URNS QL MICRO: ABNORMAL /LPF
ERYTHROCYTE [DISTWIDTH] IN BLOOD BY AUTOMATED COUNT: 12.5 % (ref 11.5–14.5)
ETHANOL SERPL-MCNC: <10 MG/DL
FLUAV RNA SPEC QL NAA+PROBE: NOT DETECTED
FLUBV RNA SPEC QL NAA+PROBE: NOT DETECTED
GLOBULIN SER CALC-MCNC: 3.4 G/DL (ref 2–4)
GLUCOSE SERPL-MCNC: 88 MG/DL (ref 65–100)
GLUCOSE UR STRIP.AUTO-MCNC: NEGATIVE MG/DL
HCT VFR BLD AUTO: 41.7 % (ref 36.6–50.3)
HGB BLD-MCNC: 14.3 G/DL (ref 12.1–17)
HGB UR QL STRIP: NEGATIVE
IMM GRANULOCYTES # BLD AUTO: 0 K/UL (ref 0–0.04)
IMM GRANULOCYTES NFR BLD AUTO: 0 % (ref 0–0.5)
KETONES UR QL STRIP.AUTO: NEGATIVE MG/DL
LEUKOCYTE ESTERASE UR QL STRIP.AUTO: NEGATIVE
LYMPHOCYTES # BLD: 3.1 K/UL (ref 0.8–3.5)
LYMPHOCYTES NFR BLD: 34 % (ref 12–49)
MCH RBC QN AUTO: 28.7 PG (ref 26–34)
MCHC RBC AUTO-ENTMCNC: 34.3 G/DL (ref 30–36.5)
MCV RBC AUTO: 83.7 FL (ref 80–99)
METHADONE UR QL: NEGATIVE
MONOCYTES # BLD: 0.8 K/UL (ref 0–1)
MONOCYTES NFR BLD: 9 % (ref 5–13)
NEUTS SEG # BLD: 4.9 K/UL (ref 1.8–8)
NEUTS SEG NFR BLD: 56 % (ref 32–75)
NITRITE UR QL STRIP.AUTO: NEGATIVE
NRBC # BLD: 0 K/UL (ref 0–0.01)
NRBC BLD-RTO: 0 PER 100 WBC
OPIATES UR QL: NEGATIVE
PCP UR QL: NEGATIVE
PH UR STRIP: 6.5 [PH] (ref 5–8)
PLATELET # BLD AUTO: 280 K/UL (ref 150–400)
PMV BLD AUTO: 10.7 FL (ref 8.9–12.9)
POTASSIUM SERPL-SCNC: 3.9 MMOL/L (ref 3.5–5.1)
PROT SERPL-MCNC: 7.5 G/DL (ref 6.4–8.2)
PROT UR STRIP-MCNC: NEGATIVE MG/DL
RBC # BLD AUTO: 4.98 M/UL (ref 4.1–5.7)
RBC #/AREA URNS HPF: ABNORMAL /HPF (ref 0–5)
SALICYLATES SERPL-MCNC: <1.7 MG/DL (ref 2.8–20)
SARS-COV-2, COV2: NOT DETECTED
SODIUM SERPL-SCNC: 143 MMOL/L (ref 136–145)
SP GR UR REFRACTOMETRY: 1.02 (ref 1–1.03)
UA: UC IF INDICATED,UAUC: ABNORMAL
UROBILINOGEN UR QL STRIP.AUTO: 1 EU/DL (ref 0.2–1)
WBC # BLD AUTO: 9 K/UL (ref 4.1–11.1)
WBC URNS QL MICRO: ABNORMAL /HPF (ref 0–4)

## 2021-08-24 PROCEDURE — 80143 DRUG ASSAY ACETAMINOPHEN: CPT

## 2021-08-24 PROCEDURE — 82077 ASSAY SPEC XCP UR&BREATH IA: CPT

## 2021-08-24 PROCEDURE — 81001 URINALYSIS AUTO W/SCOPE: CPT

## 2021-08-24 PROCEDURE — 80053 COMPREHEN METABOLIC PANEL: CPT

## 2021-08-24 PROCEDURE — 80179 DRUG ASSAY SALICYLATE: CPT

## 2021-08-24 PROCEDURE — 87636 SARSCOV2 & INF A&B AMP PRB: CPT

## 2021-08-24 PROCEDURE — 85025 COMPLETE CBC W/AUTO DIFF WBC: CPT

## 2021-08-24 PROCEDURE — 90791 PSYCH DIAGNOSTIC EVALUATION: CPT

## 2021-08-24 PROCEDURE — 99283 EMERGENCY DEPT VISIT LOW MDM: CPT

## 2021-08-24 PROCEDURE — 36415 COLL VENOUS BLD VENIPUNCTURE: CPT

## 2021-08-24 PROCEDURE — 80307 DRUG TEST PRSMV CHEM ANLYZR: CPT

## 2021-08-24 NOTE — ED NOTES
Emergency Department Nursing Plan of Care       The Nursing Plan of Care is developed from the Nursing assessment and Emergency Department Attending provider initial evaluation. The plan of care may be reviewed in the ED Provider note.     The Plan of Care was developed with the following considerations:   Patient / Family readiness to learn indicated by:verbalized understanding  Persons(s) to be included in education: patient  Barriers to Learning/Limitations:No    Signed     Alisia Esteban RN    8/24/2021   2:00 AM
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
## 2021-08-24 NOTE — ED TRIAGE NOTES
Pt reports he has been off his buspar and prozac x several weeks. Reports he stopped taking due to not helping with his mood swings. Hx of depression and bipolar. Reports he has SI w/ no plan, just thoughts. Reports mild HI toward his brother. No ETOH or drug use noted.  Tearful and flustered on arrival

## 2021-08-24 NOTE — ED NOTES
Pt reports that he is no longer having thoughts of suicide. Reports that his \"emotions are all over the place\" and that he gets \"overwhelmed\" and has \"episodes. \" Pt is stating that he is having second thoughts and that him and his brother are texting and \"made up\" and no longer wants to be admitted. Pt states that he sees his doctor, Michele Bowers, on 8/30 to discuss medications.

## 2022-03-18 PROBLEM — F32.9 MAJOR DEPRESSION: Status: ACTIVE | Noted: 2021-02-21

## 2022-03-18 PROBLEM — S41.112A LACERATION OF LEFT UPPER EXTREMITY: Status: ACTIVE | Noted: 2021-01-12

## 2022-03-18 PROBLEM — F33.2 MAJOR DEPRESSIVE DISORDER, RECURRENT EPISODE, SEVERE (HCC): Status: ACTIVE | Noted: 2021-01-12

## 2022-03-18 PROBLEM — L70.0 ACNE VULGARIS: Status: ACTIVE | Noted: 2017-07-07

## 2022-03-19 PROBLEM — T50.901A ACCIDENTAL OVERDOSE: Status: ACTIVE | Noted: 2017-11-21

## 2022-03-19 PROBLEM — H52.7 REFRACTIVE ERROR: Status: ACTIVE | Noted: 2017-07-07

## 2022-03-19 PROBLEM — F41.9 ANXIETY DISORDER: Status: ACTIVE | Noted: 2017-07-07

## 2022-03-19 PROBLEM — F43.10 PTSD (POST-TRAUMATIC STRESS DISORDER): Status: ACTIVE | Noted: 2017-07-07

## 2022-03-19 PROBLEM — K02.9 DENTAL CARIES: Status: ACTIVE | Noted: 2017-07-07

## 2022-03-20 PROBLEM — M79.7 FIBROMYALGIA: Status: ACTIVE | Noted: 2018-06-26

## 2022-10-27 ENCOUNTER — HOSPITAL ENCOUNTER (EMERGENCY)
Age: 22
Discharge: HOME | End: 2022-10-27
Attending: EMERGENCY MEDICINE
Payer: MEDICARE

## 2022-10-27 ENCOUNTER — APPOINTMENT (OUTPATIENT)
Dept: CT IMAGING | Age: 22
End: 2022-10-27
Attending: NURSE PRACTITIONER
Payer: MEDICARE

## 2022-10-27 VITALS
HEIGHT: 67 IN | WEIGHT: 190 LBS | BODY MASS INDEX: 29.82 KG/M2 | OXYGEN SATURATION: 97 % | DIASTOLIC BLOOD PRESSURE: 101 MMHG | RESPIRATION RATE: 18 BRPM | SYSTOLIC BLOOD PRESSURE: 153 MMHG | TEMPERATURE: 99.5 F | HEART RATE: 95 BPM

## 2022-10-27 DIAGNOSIS — S51.819A LACERATION OF FOREARM, UNSPECIFIED LATERALITY, INITIAL ENCOUNTER: ICD-10-CM

## 2022-10-27 DIAGNOSIS — S09.90XA MINOR HEAD INJURY, INITIAL ENCOUNTER: ICD-10-CM

## 2022-10-27 DIAGNOSIS — S81.819A LACERATION OF LOWER EXTREMITY, UNSPECIFIED LATERALITY, INITIAL ENCOUNTER: Primary | ICD-10-CM

## 2022-10-27 DIAGNOSIS — T71.194A STRANGULATION OR SUFFOCATION BY MEANS, UNDETERMINED WHETHER ACCIDENTALLY OR PURPOSELY INFLICTED, INITIAL ENCOUNTER: ICD-10-CM

## 2022-10-27 LAB
ANION GAP BLD CALC-SCNC: 14 MMOL/L (ref 10–20)
CA-I BLD-MCNC: 1.21 MMOL/L (ref 1.12–1.32)
CHLORIDE BLD-SCNC: 105 MMOL/L (ref 100–108)
CO2 BLD-SCNC: 26 MMOL/L (ref 19–24)
CREAT UR-MCNC: 0.6 MG/DL (ref 0.6–1.3)
GLUCOSE BLD STRIP.AUTO-MCNC: 104 MG/DL (ref 74–106)
LACTATE BLD-SCNC: 1.34 MMOL/L (ref 0.4–2)
POTASSIUM BLD-SCNC: 3.8 MMOL/L (ref 3.5–5.5)
SODIUM BLD-SCNC: 144 MMOL/L (ref 136–145)

## 2022-10-27 PROCEDURE — 74011250637 HC RX REV CODE- 250/637: Performed by: NURSE PRACTITIONER

## 2022-10-27 PROCEDURE — 75810000293 HC SIMP/SUPERF WND  RPR

## 2022-10-27 PROCEDURE — 90715 TDAP VACCINE 7 YRS/> IM: CPT | Performed by: NURSE PRACTITIONER

## 2022-10-27 PROCEDURE — 75810000275 HC EMERGENCY DEPT VISIT NO LEVEL OF CARE

## 2022-10-27 PROCEDURE — 74011250636 HC RX REV CODE- 250/636: Performed by: EMERGENCY MEDICINE

## 2022-10-27 PROCEDURE — 74011250636 HC RX REV CODE- 250/636: Performed by: NURSE PRACTITIONER

## 2022-10-27 PROCEDURE — 74011000250 HC RX REV CODE- 250: Performed by: NURSE PRACTITIONER

## 2022-10-27 PROCEDURE — 90471 IMMUNIZATION ADMIN: CPT

## 2022-10-27 PROCEDURE — 70498 CT ANGIOGRAPHY NECK: CPT

## 2022-10-27 PROCEDURE — 99285 EMERGENCY DEPT VISIT HI MDM: CPT

## 2022-10-27 PROCEDURE — 99284 EMERGENCY DEPT VISIT MOD MDM: CPT

## 2022-10-27 PROCEDURE — 83605 ASSAY OF LACTIC ACID: CPT

## 2022-10-27 PROCEDURE — 74011000636 HC RX REV CODE- 636: Performed by: EMERGENCY MEDICINE

## 2022-10-27 PROCEDURE — 70450 CT HEAD/BRAIN W/O DYE: CPT

## 2022-10-27 PROCEDURE — 80047 BASIC METABLC PNL IONIZED CA: CPT

## 2022-10-27 RX ORDER — HYDROCODONE BITARTRATE AND ACETAMINOPHEN 5; 325 MG/1; MG/1
1 TABLET ORAL
Status: COMPLETED | OUTPATIENT
Start: 2022-10-27 | End: 2022-10-27

## 2022-10-27 RX ORDER — LIDOCAINE HYDROCHLORIDE AND EPINEPHRINE 10; 10 MG/ML; UG/ML
1.5 INJECTION, SOLUTION INFILTRATION; PERINEURAL
Status: COMPLETED | OUTPATIENT
Start: 2022-10-27 | End: 2022-10-27

## 2022-10-27 RX ORDER — ONDANSETRON 4 MG/1
4 TABLET, ORALLY DISINTEGRATING ORAL
Status: COMPLETED | OUTPATIENT
Start: 2022-10-27 | End: 2022-10-27

## 2022-10-27 RX ORDER — CYCLOBENZAPRINE HCL 10 MG
10 TABLET ORAL
Status: COMPLETED | OUTPATIENT
Start: 2022-10-27 | End: 2022-10-27

## 2022-10-27 RX ADMIN — HYDROCODONE BITARTRATE AND ACETAMINOPHEN 1 TABLET: 5; 325 TABLET ORAL at 02:29

## 2022-10-27 RX ADMIN — BACITRACIN ZINC, NEOMYCIN, POLYMYXIN B 2 PACKET: 400; 3.5; 5 OINTMENT TOPICAL at 04:11

## 2022-10-27 RX ADMIN — CYCLOBENZAPRINE HYDROCHLORIDE 10 MG: 10 TABLET, FILM COATED ORAL at 04:03

## 2022-10-27 RX ADMIN — TETANUS TOXOID, REDUCED DIPHTHERIA TOXOID AND ACELLULAR PERTUSSIS VACCINE, ADSORBED 0.5 ML: 5; 2.5; 8; 8; 2.5 SUSPENSION INTRAMUSCULAR at 04:10

## 2022-10-27 RX ADMIN — IOPAMIDOL 100 ML: 755 INJECTION, SOLUTION INTRAVENOUS at 04:12

## 2022-10-27 RX ADMIN — ONDANSETRON 4 MG: 4 TABLET, ORALLY DISINTEGRATING ORAL at 04:49

## 2022-10-27 RX ADMIN — LIDOCAINE HYDROCHLORIDE,EPINEPHRINE BITARTRATE 15 MG: 10; .01 INJECTION, SOLUTION INFILTRATION; PERINEURAL at 02:32
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
## 2022-11-07 ENCOUNTER — HOSPITAL ENCOUNTER (EMERGENCY)
Age: 22
Discharge: HOME | End: 2022-11-07
Attending: EMERGENCY MEDICINE
Payer: MEDICARE

## 2022-11-07 VITALS
TEMPERATURE: 98.6 F | WEIGHT: 190 LBS | SYSTOLIC BLOOD PRESSURE: 150 MMHG | RESPIRATION RATE: 16 BRPM | HEART RATE: 103 BPM | BODY MASS INDEX: 28.79 KG/M2 | DIASTOLIC BLOOD PRESSURE: 82 MMHG | OXYGEN SATURATION: 97 % | HEIGHT: 68 IN

## 2022-11-07 DIAGNOSIS — Z48.02 VISIT FOR SUTURE REMOVAL: Primary | ICD-10-CM

## 2022-11-07 PROCEDURE — 75810000275 HC EMERGENCY DEPT VISIT NO LEVEL OF CARE

## 2022-11-07 NOTE — ED NOTES
Emergency Department Nursing Plan of Care       The Nursing Plan of Care is developed from the Nursing assessment and Emergency Department Attending provider initial evaluation. The plan of care may be reviewed in the ED Provider note.     The Plan of Care was developed with the following considerations:   Patient / Family readiness to learn indicated by:verbalized understanding  Persons(s) to be included in education: patient  Barriers to Learning/Limitations:No    Signed     Hannah Leslie RN    11/7/2022   2:52 PM
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
## 2022-11-07 NOTE — ED TRIAGE NOTES
Patient presents to ED from home for suture removal on left forearm and left leg. Patient denies fevers chills or drainage.

## 2022-11-07 NOTE — ED NOTES
Discharge instructions were given to the patient by Rosa Salinas RN. The patient left the Emergency Department ambulatory, alert and oriented and in no acute distress with 0 prescriptions. The patient was encouraged to call or return to the ED for worsening issues or problems and was encouraged to schedule a follow up appointment for continuing care. The patient verbalized understanding of discharge instructions and prescriptions, all questions were answered. The patient has no further concerns at this time.

## 2023-08-05 ENCOUNTER — APPOINTMENT (OUTPATIENT)
Facility: HOSPITAL | Age: 23
End: 2023-08-05
Payer: MEDICARE

## 2023-08-05 ENCOUNTER — HOSPITAL ENCOUNTER (EMERGENCY)
Facility: HOSPITAL | Age: 23
Discharge: HOME | End: 2023-08-05
Attending: EMERGENCY MEDICINE
Payer: MEDICARE

## 2023-08-05 VITALS
HEIGHT: 68 IN | OXYGEN SATURATION: 98 % | HEART RATE: 68 BPM | DIASTOLIC BLOOD PRESSURE: 76 MMHG | WEIGHT: 187 LBS | SYSTOLIC BLOOD PRESSURE: 132 MMHG | TEMPERATURE: 98.1 F | BODY MASS INDEX: 28.34 KG/M2 | RESPIRATION RATE: 16 BRPM

## 2023-08-05 DIAGNOSIS — K59.00 CONSTIPATION, UNSPECIFIED CONSTIPATION TYPE: Primary | ICD-10-CM

## 2023-08-05 DIAGNOSIS — K76.0 HEPATIC STEATOSIS: ICD-10-CM

## 2023-08-05 LAB
ALBUMIN SERPL-MCNC: 4.5 G/DL (ref 3.5–5)
ALBUMIN/GLOB SERPL: 1.2 (ref 1.1–2.2)
ALP SERPL-CCNC: 77 U/L (ref 45–117)
ALT SERPL-CCNC: 55 U/L (ref 12–78)
ANION GAP SERPL CALC-SCNC: 9 MMOL/L (ref 5–15)
APPEARANCE UR: ABNORMAL
AST SERPL-CCNC: 22 U/L (ref 15–37)
BACTERIA URNS QL MICRO: NEGATIVE /HPF
BASOPHILS # BLD: 0 K/UL (ref 0–0.1)
BASOPHILS NFR BLD: 1 % (ref 0–1)
BILIRUB SERPL-MCNC: 0.4 MG/DL (ref 0.2–1)
BILIRUB UR QL: NEGATIVE
BUN SERPL-MCNC: 9 MG/DL (ref 6–20)
BUN/CREAT SERPL: 11 (ref 12–20)
CALCIUM SERPL-MCNC: 9.2 MG/DL (ref 8.5–10.1)
CHLORIDE SERPL-SCNC: 105 MMOL/L (ref 97–108)
CO2 SERPL-SCNC: 29 MMOL/L (ref 21–32)
COLOR UR: ABNORMAL
CREAT SERPL-MCNC: 0.83 MG/DL (ref 0.7–1.3)
DIFFERENTIAL METHOD BLD: NORMAL
EOSINOPHIL # BLD: 0.1 K/UL (ref 0–0.4)
EOSINOPHIL NFR BLD: 2 % (ref 0–7)
EPITH CASTS URNS QL MICRO: ABNORMAL /LPF
ERYTHROCYTE [DISTWIDTH] IN BLOOD BY AUTOMATED COUNT: 12.9 % (ref 11.5–14.5)
GLOBULIN SER CALC-MCNC: 3.7 G/DL (ref 2–4)
GLUCOSE SERPL-MCNC: 88 MG/DL (ref 65–100)
GLUCOSE UR STRIP.AUTO-MCNC: NEGATIVE MG/DL
HCT VFR BLD AUTO: 46.5 % (ref 36.6–50.3)
HGB BLD-MCNC: 15.9 G/DL (ref 12.1–17)
HGB UR QL STRIP: ABNORMAL
IMM GRANULOCYTES # BLD AUTO: 0 K/UL (ref 0–0.04)
IMM GRANULOCYTES NFR BLD AUTO: 0 % (ref 0–0.5)
KETONES UR QL STRIP.AUTO: NEGATIVE MG/DL
LEUKOCYTE ESTERASE UR QL STRIP.AUTO: NEGATIVE
LYMPHOCYTES # BLD: 2.4 K/UL (ref 0.8–3.5)
LYMPHOCYTES NFR BLD: 31 % (ref 12–49)
MCH RBC QN AUTO: 28.1 PG (ref 26–34)
MCHC RBC AUTO-ENTMCNC: 34.2 G/DL (ref 30–36.5)
MCV RBC AUTO: 82.3 FL (ref 80–99)
MONOCYTES # BLD: 0.6 K/UL (ref 0–1)
MONOCYTES NFR BLD: 8 % (ref 5–13)
NEUTS SEG # BLD: 4.7 K/UL (ref 1.8–8)
NEUTS SEG NFR BLD: 58 % (ref 32–75)
NITRITE UR QL STRIP.AUTO: NEGATIVE
NRBC # BLD: 0 K/UL (ref 0–0.01)
NRBC BLD-RTO: 0 PER 100 WBC
PH UR STRIP: 8 (ref 5–8)
PLATELET # BLD AUTO: 294 K/UL (ref 150–400)
PMV BLD AUTO: 10.4 FL (ref 8.9–12.9)
POTASSIUM SERPL-SCNC: 3.8 MMOL/L (ref 3.5–5.1)
PROT SERPL-MCNC: 8.2 G/DL (ref 6.4–8.2)
PROT UR STRIP-MCNC: NEGATIVE MG/DL
RBC # BLD AUTO: 5.65 M/UL (ref 4.1–5.7)
RBC #/AREA URNS HPF: ABNORMAL /HPF (ref 0–5)
SODIUM SERPL-SCNC: 143 MMOL/L (ref 136–145)
SP GR UR REFRACTOMETRY: 1.01 (ref 1–1.03)
URINE CULTURE IF INDICATED: ABNORMAL
UROBILINOGEN UR QL STRIP.AUTO: 2 EU/DL (ref 0.2–1)
WBC # BLD AUTO: 7.9 K/UL (ref 4.1–11.1)
WBC URNS QL MICRO: ABNORMAL /HPF (ref 0–4)

## 2023-08-05 PROCEDURE — 6360000004 HC RX CONTRAST MEDICATION: Performed by: EMERGENCY MEDICINE

## 2023-08-05 PROCEDURE — 36415 COLL VENOUS BLD VENIPUNCTURE: CPT

## 2023-08-05 PROCEDURE — 80053 COMPREHEN METABOLIC PANEL: CPT

## 2023-08-05 PROCEDURE — 74177 CT ABD & PELVIS W/CONTRAST: CPT

## 2023-08-05 PROCEDURE — 85025 COMPLETE CBC W/AUTO DIFF WBC: CPT

## 2023-08-05 PROCEDURE — 81001 URINALYSIS AUTO W/SCOPE: CPT

## 2023-08-05 PROCEDURE — 99285 EMERGENCY DEPT VISIT HI MDM: CPT

## 2023-08-05 RX ORDER — POLYETHYLENE GLYCOL 3350 17 G/17G
17 POWDER, FOR SOLUTION ORAL DAILY PRN
Qty: 510 G | Refills: 0 | Status: SHIPPED | OUTPATIENT
Start: 2023-08-05 | End: 2023-09-04

## 2023-08-05 RX ORDER — DICYCLOMINE HCL 20 MG
20 TABLET ORAL 4 TIMES DAILY PRN
Qty: 30 TABLET | Refills: 0 | Status: SHIPPED | OUTPATIENT
Start: 2023-08-05

## 2023-08-05 RX ORDER — POLYETHYLENE GLYCOL 3350 17 G/17G
17 POWDER, FOR SOLUTION ORAL DAILY
Status: DISCONTINUED | OUTPATIENT
Start: 2023-08-06 | End: 2023-08-05

## 2023-08-05 RX ADMIN — IOPAMIDOL 100 ML: 755 INJECTION, SOLUTION INTRAVENOUS at 19:45

## 2023-08-05 ASSESSMENT — PAIN DESCRIPTION - LOCATION: LOCATION: ABDOMEN

## 2023-08-05 ASSESSMENT — PAIN DESCRIPTION - DESCRIPTORS: DESCRIPTORS: STABBING

## 2023-08-05 ASSESSMENT — PAIN - FUNCTIONAL ASSESSMENT: PAIN_FUNCTIONAL_ASSESSMENT: 0-10

## 2023-08-05 ASSESSMENT — PAIN DESCRIPTION - ORIENTATION: ORIENTATION: LEFT

## 2023-08-05 ASSESSMENT — PAIN DESCRIPTION - PAIN TYPE: TYPE: ACUTE PAIN

## 2023-08-05 ASSESSMENT — PAIN SCALES - GENERAL: PAINLEVEL_OUTOF10: 6

## 2023-08-05 ASSESSMENT — PAIN DESCRIPTION - FREQUENCY: FREQUENCY: INTERMITTENT

## 2023-08-05 NOTE — ED TRIAGE NOTES
Reports left lower abd pain x2 days even after having bowel movement. No nausea or vomiting but does report headache.
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
## 2023-08-06 NOTE — ED NOTES
Discharge instructions were given to the patient by Justin Granados RN. The patient left the Emergency Department ambulatory, alert and oriented and in no acute distress with 2 prescriptions. The patient was encouraged to call or return to the ED for worsening issues or problems and was encouraged to schedule a follow up appointment for continuing care. The patient verbalized understanding of discharge instructions and prescriptions, all questions were answered. The patient has no further concerns at this time.         Catherine Stinson RN  08/05/23 8734

## 2023-10-02 NOTE — TELEPHONE ENCOUNTER
Patient seen today at [facility] while participating in [address]    They are also receiving care and oversight by Collaborating Physician, Dr. Ruy Toscano     10/20/1940 MRN [id number]  [facility] Admission 23  Last Hospital Discharge 23 PCP Miladys Ortega MD    HPI:  Arnulfo Hanley is a 79year old female w/ PMHx significant for osteoarthritis, osteoporosis, hyperlipidemia, CKD 2-3, unerlying cardiomyopathy w/ EF 20-30%. She presented to the ED after falling and landing on her left hip. Xray showed displaced intertrochanteric fracture of left femur w/ avulsion of the lesser trochanter. Cardiology and Ortho surgery consulted. She underwent L hip/femoral nailing on  w/ Dr. Fortunato Henson. Hospital course complicated by NSTEMI. She underwent LHC on . Also found to have new onset Afib. Started on metoprolol, losartan, and plavix. Hospital course complicated by low BPs - hold parameters added to cardiac meds. Therapy eval recommended [facility]. Patient was stabilized and discharged to [facility] for further monitoring and rehabilitation. Hospital Discharge Diagnoses:  - Left hip IT fracture 2/2 mechanical fall  - NSTEMI  -Hx ischemic cardiomyopathy  - Hypotension  - New Afib  - Hx CKD  - HLD  Chief Complaint at Visit:  Patient presents with: Follow - Up: Intertrochanteric fracture L femur s/p nailing; NSTEMI, new Afib, weakness  ALLERGIES  She is allergic to bacitracin, codeine, erythromycin, iodine (topical), milk protein extract, neomycin-polymyxin-dexameth, shellfish-derived products, sulfasalazine, sulfa antibiotics, aspirin, gold salts, lipitor [atorvastatin], silver, and sulfamethoxazole w/trimethoprim. CURRENT MEDS:  acetaminophen 325 MG Oral Tab, Take 2 tablets (650 mg total) by mouth every 6 (six) hours. meclizine 12.5 MG Oral Tab, Take 1 tablet (12.5 mg total) by mouth daily as needed (BPPV).   docusate sodium 100 Pls call Dr. Lizbeth Smith, Psychiatrist     Reports possible drug interaction     Re: Cymbalta & [place]     Best number to reach her is [phone] MG Oral Cap, Take 1 capsule (100 mg total) by mouth 2 (two) times daily. polyethylene glycol, PEG 3350, 17 g Oral Powd Pack, Take 17 g by mouth daily as needed (constipation). metoprolol succinate ER 25 MG Oral Tablet 24 Hr, Take 1 tablet (25 mg total) by mouth Daily Beta Blocker. clopidogrel 75 MG Oral Tab, Take 1 tablet (75 mg total) by mouth daily. losartan 25 MG Oral Tab, Take 0.5 tablets (12.5 mg total) by mouth every evening.  ezetimibe 10 MG Oral Tab, Take 1 tablet (10 mg total) by mouth nightly. No current facility-administered medications on file prior to visit. HISTORY:  She has a past medical history of Calculus of kidney, Cancer ([address] Cataract, Hyperlipidemia, and Polio. She has no past surgical history on file. CODE STATUS VERIFIED: DNR/Select  SUBJECTIVE/ ROS:  Patient seen today in bed, in no acute distress. Sts she is doing ok. Mild pain to L hip. Dressing  intact. Pt sts that her LLE remains weak and numb but not worsening. Sts that LLE was weaker to begin with due to POlio. She reports being bale to ambulate w/ a walker and PT. Tolerating diet. Rash resolved. Asking for albuterol inhaler. Sts it was prescribed to her PRN by cardiologist long time ago for wheezing and she feels she could use it at times. Currently no wheezing noted. Denies SOB.    REVIEW OF SYSTEMS:  GENERAL HEALTH:feels well otherwise  SKIN: denies any unusual skin lesions or rashes  WOUNDS: +L hip surgical incisions w/ dressing intact (old drainage present)  EYES:no visual complaints  HENT: denies nasal congestion, sinus pain or sore throat  RESPIRATORY:denies shortness of breath, wheezing or cough  CARDIOVASCULAR: no complaints; denies chest pain, palpitations  GI: no complaints; denies abdominal discomfort, nausea, or vomiting; denies constipation  :nourinary complaints; no dysuria, urgency or frequency  MUSCULOSKELETAL: L hip pain s/p surgery - improved w/ scheduled tylenol; no other complaints; denies calf pain b/l  NEURO:no sensory or motor complaints; +intermittent vertigo sx  PSYCHE: no complaints of depression or anxiety  HEMATOLOGY:no history of anemia, OTONIEL or B12 deficiency  ASSESSMENT/ PHYSICAL EXAM:  GENERAL HEALTH: well developed, appears well nourished, in no apparent distress  LINES, TUBES, DRAINS: none  SKIN: no rashes, no suspicious lesions  WOUND: L hip surgical incision sites x2 - dressings in place, minimal old drainage, no surrounding erythema or heat  EYES: conjunctiva normal, no drainage from eyes  HENT: normocephalic; normal nose, no nasal drainage, mucous membranes pink, moist  RESPIRATORY: clear to auscultation; respirations regular, unlabored  CARDIOVASCULAR: S1, S2 normal, RRR  ABDOMEN: active BS+, soft, non-distended; no visible masses; non-tender, no guarding  : deferred  MUSCULOSKELETAL: weakness r/t recent hospitalization/diagnoses/sequelae; will undergo therapies to rehab and improve strength, endurance and independence with ADLs as able/tolerated  EXTREMITIES/VASCULAR: mild edema LLE; pedal pulses +2 b/l; toes warm/pink b/l; no calf pain/swelling/redness/heat b/l  NEUROLOGIC: intact; no sensorimotor deficit; no facial asymmetry, unilateral weakness, or pronator drift + left BPPV assessment per physical therapy  PSYCHIATRIC: alert and oriented x 3; affect appropriate  MEDICAL DECISION MAKING and PLAN OF CARE:  Continue therapies  BPPV  - +left bppv per therapy eval  - Therapy unable to perform tx exercises d/t L hip surgery  - Meclizine 12.5mg daily prn  Left hip IT fracture 2/2 mechanical fall  - S/p nailing on 9/1 w/ Dr. Alysha Crockett  - WBAT  - Tylenol 650mg Q6H  - Plavix sufficient for vte prophylaxis per ortho  - TEDs compression b/l during the day  - Ice pack application prn for pain relief - no more than 20min intervals  - PT/OT therapies as indicated  - F/u w/ Ortho- appt 9/22 per patient report  NSTEMI  Hx ischemic cardiomyopathy  - Echo 25-30%  - S/p [facility] 8/31 - per notes, multivessel CAD but not CABG candidate  - Metoprolol succinate 25mg daily - hold for SBP < 100 or HR < 60  - Losartan 12.5mg nightly - hold for SBP < 100 or HR < 60  - Plavix 75mg daily  New Afib  - Metoprolol as above  - Plavix as above  Anemia  - Hgb mostly upper 7's/low 8's during hospitalization  - Suspect component of blood loss 2/2 surgery  - CBC weekly, monitor for trends  - Monitor for s/sx active bleeding  - Check iron, folate, b12, ferritin  Hx CKD  - Avoid nephrotoxins as able  - CMP weekly, monitor trends  HLD  - Intolerant to statin  - Zetia 10mg nightly  Physical Deconditioning/Weakness; at risk for falling  - Fall Precautions  - PT/OT/ST to evaluate and treat  - [facility] team to establish care plan meeting with patient and POA/family as appropriate  - [facility] team/ & discharge planner to assist with establishing safe discharge plan for next level of care  DVT Prophylaxis  - Encourage exercise and participation with therapy as much as able  - Plavix, MARY compression stockings  GI Prophylaxis  Pain Management  - Offer to pre-medicate 30-60 min prior to therapy  - Physiatry evaluation with management appreciated  - Tylenol as above  Bowel Management Regimen/Constipation  - Colace 100mg BID  - Miralax 17g daily prn  Vitamins/supplements as r/t deficiencies  MILTON RD to follow while in rehab; supplementation/diet as per MILTON RD  May continue home supplements:  *This patient will undergo PT/OT/SLP evaluation with treatment as needed. *Skilled nursing care including assistance with ADLs and medication administration. Labs:  - CBC, CMP weekly  Discharge Planning:  - Discharge Date: TBD  - Disposition: TBD  Follow-Up:  - PCP within 1-2 weeks following [facility]TON discharge.   - Specialists as recommended:  ---> Cardiology (Dr. Chica James) - requested RN to f/u on scheduling appt  ---> Ortho (Dr. Angela Horta) -appt 9/22 per patient  Note to patient: The [facility] makes medical notes like these available to patients in the interest of transparency. However, this is a medical document intended as peer to peer communication. It is written in medical language and may contain abbreviations or verbiage that are unfamiliar. It may appear blunt or direct. Medical documents are intended to carry relevant information, facts as evident, and the clinical opinion of the practitioner who signs the document. >30 minute including face-to-face visit, counseling the patient, reviewing records, and/or coordinating care.

## 2024-03-29 NOTE — GROUP NOTE
-- DO NOT REPLY / DO NOT REPLY ALL --  -- Message is from [employer] ([employer]) --    General Patient Message:     Please call Vane from [employer] to confirm that fax was received for Diabetes supplies. Fax sent March 5th.  [phone]     Caller Information         Type Contact Phone/Fax    03/29/2024 08:26 AM CDT Phone (Incoming) VANE [phone]     [facility]          Alternative phone number: na    Can a detailed message be left? Yes    Message Turnaround:     Is it Working Hours? Yes - Working Hours                      LAYNE  GROUP DOCUMENTATION INDIVIDUAL Group Therapy Note Date: 2/23/2021 Group Start Time: 1400 Group End Time: 1500 Group Topic: Recreational/Music Therapy [facility] [place] [address] ACUTE BEHAV [facility], [address] GROUP DOCUMENTATION GROUP Group Therapy Note Attendees: 8 Attendance: Attended Patient's Goal:  To concentrate on selected task Interventions/techniques: Supported-crafts,games,music Follows Directions: Followed directions Interactions: Interacted appropriately Mental Status: Calm Behavior/appearance: Attentive, Cooperative and Needed prompting Goals Achieved: Able to engage in interactions and Able to listen to others Additional Notes:  Affect brightened during interactions with peers Mert Blount

## 2024-07-31 ENCOUNTER — HOSPITAL ENCOUNTER (INPATIENT)
Facility: HOSPITAL | Age: 24
LOS: 2 days | Discharge: HOME | End: 2024-08-02
Attending: EMERGENCY MEDICINE | Admitting: PSYCHIATRY & NEUROLOGY
Payer: MEDICARE

## 2024-07-31 DIAGNOSIS — R45.851 SUICIDAL IDEATION: Primary | ICD-10-CM

## 2024-07-31 DIAGNOSIS — T07.XXXA ABRASIONS OF MULTIPLE SITES: ICD-10-CM

## 2024-07-31 PROBLEM — F43.20 ADJUSTMENT DISORDER: Status: ACTIVE | Noted: 2024-07-31

## 2024-07-31 LAB
ALBUMIN SERPL-MCNC: 4.2 G/DL (ref 3.5–5)
ALBUMIN/GLOB SERPL: 1.3 (ref 1.1–2.2)
ALP SERPL-CCNC: 55 U/L (ref 45–117)
ALT SERPL-CCNC: 38 U/L (ref 12–78)
AMPHET UR QL SCN: NEGATIVE
ANION GAP SERPL CALC-SCNC: 12 MMOL/L (ref 5–15)
AST SERPL-CCNC: 17 U/L (ref 15–37)
BARBITURATES UR QL SCN: NEGATIVE
BASOPHILS # BLD: 0.1 K/UL (ref 0–0.1)
BASOPHILS NFR BLD: 1 % (ref 0–1)
BENZODIAZ UR QL: NEGATIVE
BILIRUB SERPL-MCNC: 0.4 MG/DL (ref 0.2–1)
BUN SERPL-MCNC: 12 MG/DL (ref 6–20)
BUN/CREAT SERPL: 15 (ref 12–20)
CALCIUM SERPL-MCNC: 9.2 MG/DL (ref 8.5–10.1)
CANNABINOIDS UR QL SCN: NEGATIVE
CHLORIDE SERPL-SCNC: 106 MMOL/L (ref 97–108)
CO2 SERPL-SCNC: 26 MMOL/L (ref 21–32)
COCAINE UR QL SCN: NEGATIVE
CREAT SERPL-MCNC: 0.8 MG/DL (ref 0.7–1.3)
DIFFERENTIAL METHOD BLD: NORMAL
EOSINOPHIL # BLD: 0.1 K/UL (ref 0–0.4)
EOSINOPHIL NFR BLD: 1 % (ref 0–7)
ERYTHROCYTE [DISTWIDTH] IN BLOOD BY AUTOMATED COUNT: 12.9 % (ref 11.5–14.5)
ETHANOL SERPL-MCNC: <10 MG/DL (ref 0–0.08)
GLOBULIN SER CALC-MCNC: 3.3 G/DL (ref 2–4)
GLUCOSE SERPL-MCNC: 98 MG/DL (ref 65–100)
HCT VFR BLD AUTO: 44.5 % (ref 36.6–50.3)
HGB BLD-MCNC: 14.9 G/DL (ref 12.1–17)
IMM GRANULOCYTES # BLD AUTO: 0 K/UL (ref 0–0.04)
IMM GRANULOCYTES NFR BLD AUTO: 0 % (ref 0–0.5)
LYMPHOCYTES # BLD: 2.1 K/UL (ref 0.8–3.5)
LYMPHOCYTES NFR BLD: 21 % (ref 12–49)
Lab: NORMAL
MCH RBC QN AUTO: 28.5 PG (ref 26–34)
MCHC RBC AUTO-ENTMCNC: 33.5 G/DL (ref 30–36.5)
MCV RBC AUTO: 85.2 FL (ref 80–99)
METHADONE UR QL: NEGATIVE
MONOCYTES # BLD: 0.8 K/UL (ref 0–1)
MONOCYTES NFR BLD: 8 % (ref 5–13)
NEUTS SEG # BLD: 7 K/UL (ref 1.8–8)
NEUTS SEG NFR BLD: 69 % (ref 32–75)
NRBC # BLD: 0 K/UL (ref 0–0.01)
NRBC BLD-RTO: 0 PER 100 WBC
OPIATES UR QL: NEGATIVE
PCP UR QL: NEGATIVE
PLATELET # BLD AUTO: 263 K/UL (ref 150–400)
PMV BLD AUTO: 10.2 FL (ref 8.9–12.9)
POTASSIUM SERPL-SCNC: 3.9 MMOL/L (ref 3.5–5.1)
PROT SERPL-MCNC: 7.5 G/DL (ref 6.4–8.2)
RBC # BLD AUTO: 5.22 M/UL (ref 4.1–5.7)
SODIUM SERPL-SCNC: 144 MMOL/L (ref 136–145)
WBC # BLD AUTO: 10 K/UL (ref 4.1–11.1)

## 2024-07-31 PROCEDURE — 82077 ASSAY SPEC XCP UR&BREATH IA: CPT

## 2024-07-31 PROCEDURE — 80053 COMPREHEN METABOLIC PANEL: CPT

## 2024-07-31 PROCEDURE — 85025 COMPLETE CBC W/AUTO DIFF WBC: CPT

## 2024-07-31 PROCEDURE — 6370000000 HC RX 637 (ALT 250 FOR IP): Performed by: PSYCHIATRY & NEUROLOGY

## 2024-07-31 PROCEDURE — 80307 DRUG TEST PRSMV CHEM ANLYZR: CPT

## 2024-07-31 PROCEDURE — 6370000000 HC RX 637 (ALT 250 FOR IP): Performed by: EMERGENCY MEDICINE

## 2024-07-31 PROCEDURE — 1240000000 HC EMOTIONAL WELLNESS R&B

## 2024-07-31 PROCEDURE — 99223 1ST HOSP IP/OBS HIGH 75: CPT | Performed by: PSYCHIATRY & NEUROLOGY

## 2024-07-31 PROCEDURE — 90791 PSYCH DIAGNOSTIC EVALUATION: CPT

## 2024-07-31 PROCEDURE — 99285 EMERGENCY DEPT VISIT HI MDM: CPT

## 2024-07-31 PROCEDURE — 36415 COLL VENOUS BLD VENIPUNCTURE: CPT

## 2024-07-31 RX ORDER — TRAZODONE HYDROCHLORIDE 50 MG/1
50 TABLET ORAL NIGHTLY PRN
Status: DISCONTINUED | OUTPATIENT
Start: 2024-07-31 | End: 2024-08-02 | Stop reason: HOSPADM

## 2024-07-31 RX ORDER — HALOPERIDOL 5 MG/1
5 TABLET ORAL EVERY 4 HOURS PRN
Status: DISCONTINUED | OUTPATIENT
Start: 2024-07-31 | End: 2024-08-02 | Stop reason: HOSPADM

## 2024-07-31 RX ORDER — LORAZEPAM 1 MG/1
1 TABLET ORAL ONCE
Status: COMPLETED | OUTPATIENT
Start: 2024-07-31 | End: 2024-07-31

## 2024-07-31 RX ORDER — POLYETHYLENE GLYCOL 3350 17 G/17G
17 POWDER, FOR SOLUTION ORAL DAILY PRN
Status: DISCONTINUED | OUTPATIENT
Start: 2024-07-31 | End: 2024-08-02 | Stop reason: HOSPADM

## 2024-07-31 RX ORDER — ONDANSETRON 4 MG/1
4 TABLET, ORALLY DISINTEGRATING ORAL EVERY 8 HOURS PRN
Status: DISCONTINUED | OUTPATIENT
Start: 2024-07-31 | End: 2024-08-02 | Stop reason: HOSPADM

## 2024-07-31 RX ORDER — MAGNESIUM HYDROXIDE/ALUMINUM HYDROXICE/SIMETHICONE 120; 1200; 1200 MG/30ML; MG/30ML; MG/30ML
30 SUSPENSION ORAL EVERY 6 HOURS PRN
Status: DISCONTINUED | OUTPATIENT
Start: 2024-07-31 | End: 2024-08-02 | Stop reason: HOSPADM

## 2024-07-31 RX ORDER — HALOPERIDOL 5 MG/ML
5 INJECTION INTRAMUSCULAR EVERY 4 HOURS PRN
Status: DISCONTINUED | OUTPATIENT
Start: 2024-07-31 | End: 2024-08-02 | Stop reason: HOSPADM

## 2024-07-31 RX ORDER — HYDROXYZINE HYDROCHLORIDE 25 MG/1
50 TABLET, FILM COATED ORAL 3 TIMES DAILY PRN
Status: DISCONTINUED | OUTPATIENT
Start: 2024-07-31 | End: 2024-08-02 | Stop reason: HOSPADM

## 2024-07-31 RX ORDER — LAMOTRIGINE 25 MG/1
25 TABLET ORAL DAILY
Status: DISCONTINUED | OUTPATIENT
Start: 2024-07-31 | End: 2024-08-02 | Stop reason: HOSPADM

## 2024-07-31 RX ORDER — DIPHENHYDRAMINE HYDROCHLORIDE 50 MG/ML
50 INJECTION INTRAMUSCULAR; INTRAVENOUS EVERY 4 HOURS PRN
Status: DISCONTINUED | OUTPATIENT
Start: 2024-07-31 | End: 2024-08-02 | Stop reason: HOSPADM

## 2024-07-31 RX ORDER — ACETAMINOPHEN 325 MG/1
650 TABLET ORAL EVERY 4 HOURS PRN
Status: DISCONTINUED | OUTPATIENT
Start: 2024-07-31 | End: 2024-08-02 | Stop reason: HOSPADM

## 2024-07-31 RX ADMIN — ONDANSETRON 4 MG: 4 TABLET, ORALLY DISINTEGRATING ORAL at 03:42

## 2024-07-31 RX ADMIN — LORAZEPAM 1 MG: 1 TABLET ORAL at 02:26

## 2024-07-31 RX ADMIN — LAMOTRIGINE 25 MG: 25 TABLET ORAL at 14:10

## 2024-07-31 ASSESSMENT — SLEEP AND FATIGUE QUESTIONNAIRES
SLEEP PATTERN: RESTLESSNESS;DIFFICULTY FALLING ASLEEP
DO YOU HAVE DIFFICULTY SLEEPING: YES
AVERAGE NUMBER OF SLEEP HOURS: 6
DO YOU USE A SLEEP AID: NO

## 2024-07-31 ASSESSMENT — PAIN SCALES - GENERAL: PAINLEVEL_OUTOF10: 0

## 2024-07-31 ASSESSMENT — LIFESTYLE VARIABLES
HOW OFTEN DO YOU HAVE A DRINK CONTAINING ALCOHOL: MONTHLY OR LESS
HOW MANY STANDARD DRINKS CONTAINING ALCOHOL DO YOU HAVE ON A TYPICAL DAY: 1 OR 2

## 2024-07-31 ASSESSMENT — PAIN - FUNCTIONAL ASSESSMENT: PAIN_FUNCTIONAL_ASSESSMENT: NONE - DENIES PAIN

## 2024-07-31 NOTE — GROUP NOTE
Group Therapy Note    Date: 7/31/2024    Group Start Time: 1000  Group End Time: 1115  Group Topic: Topic Group    [facility] 3 ACUTE BEHAV [facility]    Lauren Brown        Group Therapy Note    Attendees: 3       Patient's Goal:  To participate in relaxation activity    Notes:  Pt did not attend session      Discipline Responsible: Recreational Therapist      Signature:  LAUREN BROWN
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
## 2024-07-31 NOTE — PROGRESS NOTES
[facility]: MED RECONCILIATION    Comments/Recommendations:   Source: patient interview, RX Query refill history   Patient is not currently on any medications. Patient reports previously being on fluoxetine but stopped taking it.     Medications removed:    Clonidine  Dicyclomine       Stefani Gagnon RPH  Contact: [phone]

## 2024-07-31 NOTE — ED NOTES
Pt reports feeling nauseous after having blood work done  Dr Gaviria notified, Zofran will be ordered

## 2024-07-31 NOTE — ED NOTES
This RN attempted to obtain blood work, pt reports he gets anxious. Pt reports feeling anxious and nauseous and asked this RN to wait
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
## 2024-07-31 NOTE — BH NOTE
Writer en countered patient in bedroom.  Morning assessment complete.  Patient is calm and cooperative.   Eye contact is noted to be poor.   Mood is noted to be depressed and anxious.  Patient rated anxiety and depression 6/10.  Affect is noted to be constricted.   Patient denies SI/HI/AVH.    Patient has been isolative to room.    Patient is both med and meal compliant. There are no untoward side effects from medications to note.     C-SSRS level is no risk.    Hourly rounds are being completed to assure patient safety and attend to care needs. Monitoring will continue for changes in patient condition.
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
## 2024-07-31 NOTE — PROGRESS NOTES
GROUP THERAPY PROGRESS NOTE        GROUP TIME: 45 minutes, Wednesdays 2pm    Participants: 3    PERSONAL GOAL FOR PARTICIPATION: To be present for group, participate in discussion, and answer patient-directed questions.    GOAL ORIENTATION: Personal    THERAPEUTIC INTERVENTIONS REVIEWED AND DISCUSSED: The following topics were presented: storage of medications, how to remember to refill medications and keep up with doctor appointments, relapse prevention, keeping a record of all medication including prescription and non-prescription drugs, and who to contact with medication questions. Patients were given time to ask questions regarding their current therapy.    IMPRESSION OF PARTICIPATION: Carter Dickson was not present for medication group.      Stefani Gagnon [facility] ED

## 2024-07-31 NOTE — BSMART NOTE
BSMART assessment completed, and suicide risk level noted to be moderate risk. Primary Nurse Jessica and Charge Nurse Chantelle and Physician Everette notified. Concerns not observed.    Patient contracts for safety in ED
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
## 2024-07-31 NOTE — PROGRESS NOTES
Laboratory Monitoring for Mood Stabilizers and Antipsychotics    Recommended baseline monitoring has been completed based on this patient's current medication regimen.       This patient is currently prescribed the following medication(s):   Current Facility-Administered Medications: ondansetron (ZOFRAN-ODT) disintegrating tablet 4 mg, 4 mg, Oral, Q8H PRN  acetaminophen (TYLENOL) tablet 650 mg, 650 mg, Oral, Q4H PRN  polyethylene glycol (GLYCOLAX) packet 17 g, 17 g, Oral, Daily PRN  aluminum & magnesium hydroxide-simethicone (MAALOX) 200-200-20 MG/5ML suspension 30 mL, 30 mL, Oral, Q6H PRN  hydrOXYzine HCl (ATARAX) tablet 50 mg, 50 mg, Oral, TID PRN  haloperidol (HALDOL) tablet 5 mg, 5 mg, Oral, Q4H PRN **OR** haloperidol lactate (HALDOL) injection 5 mg, 5 mg, IntraMUSCular, Q4H PRN  diphenhydrAMINE (BENADRYL) injection 50 mg, 50 mg, IntraMUSCular, Q4H PRN  traZODone (DESYREL) tablet 50 mg, 50 mg, Oral, Nightly PRN  lamoTRIgine (LAMICTAL) tablet 25 mg, 25 mg, Oral, Daily      The following labs have been completed for monitoring of antipsychotics and/or mood stabilizers:    Height, Weight, BMI Estimation  Estimated body mass index is 28.98 kg/m² as calculated from the following:    Height as of this encounter: 1.702 m (5' 7\").    Weight as of this encounter: 83.9 kg (185 lb).     Vital Signs/Blood Pressure  BP (!) 150/92   Pulse 67   Temp 97.3 °F (36.3 °C) (Oral)   Resp 16   Ht 1.702 m (5' 7\")   Wt 83.9 kg (185 lb)   SpO2 96%   BMI 28.98 kg/m²     Renal Function, Hepatic Function and Chemistry  Estimated Creatinine Clearance: 147 mL/min (based on SCr of 0.8 mg/dL).    Lab Results   Component Value Date/Time     07/31/2024 03:23 AM    K 3.9 07/31/2024 03:23 AM     07/31/2024 03:23 AM    CO2 26 07/31/2024 03:23 AM    ANIONGAP 12 07/31/2024 03:23 AM    GLUCOSE 98 07/31/2024 03:23 AM    BUN 12 07/31/2024 03:23 AM    CREATININE 0.80 07/31/2024 03:23 AM    BILITOT 0.4 07/31/2024 03:23 AM    GLOB 3.3
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
## 2024-07-31 NOTE — CARE COORDINATION
07/31/24 0906   Suicidal Ideation   Wish to be Dead Lifetime - Yes;Past 1 month - Yes   Non-Specific Active Suicidal Thoughts Past 1 month - Yes;Lifetime - Yes   Suicidal Behavior Trigger Wish to be Dead;Non-Specific Active Suicidal Thoughts   Active Suicidal Ideation with Any Methods (Not Plan) without Intent to Act Lifetime - Yes;Past 1 month - Yes   Active Suicidal Ideation with Some Intent to Act, without Specific Plan Past 1 month - Yes;Lifetime - Yes   Active Suicidal Ideation with Specific Plan and Intent Lifetime - Yes;Past 1 month - No   Intensity of Ideation   Lifetime - Most Severe Ideation 5 - most severe   Lifetime - Most Recent Ideation 3   Frequency Once a week   Duration Less than 1 hour/some of the time   Controllability Can control thoughts with little difficulty   Deterrents Deterrents probably stopped you   Reasons for Ideation Mostly to end or stop the pain   Suicidal Behavior   Actual Attempt Lifetime - Yes;Past 3 months - No   Has subject engaged in Non-Suicidal Self-Injurious Behavior? Lifetime - No;Past 3 months - No   Interrupted Attempt Lifetime - Yes;Past 3 months - No   Aborted or Self-Interrupted Attempt Lifetime - Yes;Past 3 months - No   Preparatory Acts or Behavior Lifetime - Yes;Past 3 months - No

## 2024-07-31 NOTE — PLAN OF CARE
Problem: Anxiety  Goal: Will report anxiety at manageable levels  Description: INTERVENTIONS:  1. Administer medication as ordered  2. Teach and rehearse alternative coping skills  3. Provide emotional support with 1:1 interaction with staff  Outcome: Not Progressing

## 2024-08-01 PROCEDURE — 1240000000 HC EMOTIONAL WELLNESS R&B

## 2024-08-01 PROCEDURE — 6370000000 HC RX 637 (ALT 250 FOR IP): Performed by: PSYCHIATRY & NEUROLOGY

## 2024-08-01 PROCEDURE — 99232 SBSQ HOSP IP/OBS MODERATE 35: CPT | Performed by: PSYCHIATRY & NEUROLOGY

## 2024-08-01 RX ORDER — BUPROPION HYDROCHLORIDE 150 MG/1
150 TABLET ORAL DAILY
Status: DISCONTINUED | OUTPATIENT
Start: 2024-08-01 | End: 2024-08-02 | Stop reason: HOSPADM

## 2024-08-01 RX ADMIN — BUPROPION HYDROCHLORIDE 150 MG: 150 TABLET, EXTENDED RELEASE ORAL at 10:43

## 2024-08-01 RX ADMIN — HYDROXYZINE HYDROCHLORIDE 50 MG: 25 TABLET, FILM COATED ORAL at 22:15

## 2024-08-01 RX ADMIN — LAMOTRIGINE 25 MG: 25 TABLET ORAL at 09:21

## 2024-08-01 ASSESSMENT — PAIN SCALES - GENERAL
PAINLEVEL_OUTOF10: 0
PAINLEVEL_OUTOF10: 0

## 2024-08-01 NOTE — BH NOTE
Evening assessment complete. Patient was encountered in the hallway. VS are stable.    He is calm and cooperative. Eye contact is noted to be fair. Mood is noted to be depressed and anxious. Affect is constricted.     Patient currently rates anxiety 6/10 and depression 5/10, but denies all SI/HI, AVH. C-SSRS level is No risk.     Patient has been isolative to himself.    No medications scheduled for this shift and no PRNs given.     Hourly rounds are being completed to assure patient safety and attend to care needs. Monitoring will continue for changes in patient condition       SLEEP HOURS- 7 1/2

## 2024-08-01 NOTE — PLAN OF CARE
Problem: Safety - Adult  Goal: Free from fall injury  Outcome: Progressing     Problem: Self Harm/Suicidality  Goal: Will have no self-injury during hospital stay  Description: INTERVENTIONS:  1.  Ensure constant observer at bedside with Q15M safety checks  2.  Maintain a safe environment  3.  Secure patient belongings  4.  Ensure family/visitors adhere to safety recommendations  5.  Ensure safety tray has been added to patient's diet order  6.  Every shift and PRN: Re-assess suicidal risk via Frequent Screener    Outcome: Progressing

## 2024-08-01 NOTE — PLAN OF CARE
Problem: Anxiety  Goal: Will report anxiety at manageable levels  Description: INTERVENTIONS:  1. Administer medication as ordered  2. Teach and rehearse alternative coping skills  3. Provide emotional support with 1:1 interaction with staff  Outcome: Progressing     Problem: Coping  Goal: Pt/Family able to verbalize concerns and demonstrate effective coping strategies  Description: INTERVENTIONS:  1. Assist patient/family to identify coping skills, available support systems and cultural and spiritual values  2. Provide emotional support, including active listening and acknowledgement of concerns of patient and caregivers  3. Reduce environmental stimuli, as able  4. Instruct patient/family in relaxation techniques, as appropriate  5. Assess for spiritual pain/suffering and initiate Spiritual Care, Psychosocial Clinical Specialist consults as needed  Outcome: Progressing

## 2024-08-01 NOTE — BH NOTE
Behavioral Health Interdisciplinary Rounds     Patient Name: Carter Dickson Age: 23 y.o. Room/Bed:  322/01  Primary Diagnosis: Major depressive disorder, recurrent episode, severe (HCC)  Admission Status: Voluntary     Readmission within 30 days: No  Power of  in place: No  Patient requires a blocked bed: No          Reason for blocked bed: n/a    Sleep hours: 7.5       Participation in Care/Groups:  No  Medication Compliant?: Yes  PRNS (last 24 hours):  Anti-nausea      Restraints (last 24 hours):  No  Substance Abuse:  No    24 hour chart check complete: Yes    Patient goal(s) for today: Take medications as prescribed, , engage in unit activities, participate in hygiene/ADLs, and communicate needs to appropriate staff,   Treatment team focus/goals: MD adjust medications as appropriate, identify discharge planning needs, coordination of care    Progress note:   Pt met with treatment team. Pt is aox4. Pt is isolative and withdrawn, though calm and cooperative with staff. Pt denies SI/HI/AVH. Pt endorses anxiety. Pt presents with depressed mood and flat affect. Pt is asking appropriate questions about treatment. Pt thought process is linear and pt thought content is WNL. Pt reports he is feeling more emotionally regulated than yesterday but still feels anxious. Speech is soft. Eye contact fair. ADLs fair.       SW to schedule psychiatrist follow up.     LOS:  1  Expected LOS: 5    Insurance coverage: Medicare, [employer] Medicaid  Family contact:        Family requesting physician contact today:  No  Discharge plan: Home/Self Care.   Access to weapons: No       Outpatient provider(s): [facility]   Patient's preferred phone number for follow up call: [phone]    Participating treatment team members: Carter BRYANT Randolph, MESSI Hewitt, Deyanira Hunter MD

## 2024-08-01 NOTE — BH NOTE
Pt received sitting in bed quietly. A&Ox4. Affect flat, eye contact fair. No apparent distress noted. Denies SI/HI/AVH. Reports feeling anxious, rated 6/10 on numeric scale. Denies feeling depressed but appears sad. Compliant with scheduled medication but reports feeling anxious r/t starting new medications. This writer and treatment team educated pt about purpose and side effects of new medications, pt receptive. Pt used coloring pages to cope with anxiety. Q15 minute rounds maintained for safety.
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
## 2024-08-01 NOTE — GROUP NOTE
Group Therapy Note    Date: 8/1/2024    Group Start Time: 1400  Group End Time: 1500  Group Topic: Recreational    [facility] AC[facility]    Lauren Brown        Group Therapy Note    Attendees: 5       Patient's Goal:  To concentrate on selected task    Notes:  Pt attended session,declined active participation,depressed mood,isolative    Discipline Responsible: Recreational Therapist      Signature:  LAUREN BROWN

## 2024-08-02 VITALS
RESPIRATION RATE: 18 BRPM | TEMPERATURE: 98 F | BODY MASS INDEX: 29.03 KG/M2 | DIASTOLIC BLOOD PRESSURE: 69 MMHG | HEIGHT: 67 IN | SYSTOLIC BLOOD PRESSURE: 119 MMHG | WEIGHT: 185 LBS | OXYGEN SATURATION: 99 % | HEART RATE: 58 BPM

## 2024-08-02 PROCEDURE — 6370000000 HC RX 637 (ALT 250 FOR IP): Performed by: PSYCHIATRY & NEUROLOGY

## 2024-08-02 PROCEDURE — 99239 HOSP IP/OBS DSCHRG MGMT >30: CPT | Performed by: PSYCHIATRY & NEUROLOGY

## 2024-08-02 RX ORDER — BUPROPION HYDROCHLORIDE 150 MG/1
150 TABLET ORAL DAILY
Qty: 30 TABLET | Refills: 1 | Status: SHIPPED | OUTPATIENT
Start: 2024-08-03

## 2024-08-02 RX ADMIN — LAMOTRIGINE 25 MG: 25 TABLET ORAL at 09:04

## 2024-08-02 RX ADMIN — BUPROPION HYDROCHLORIDE 150 MG: 150 TABLET, EXTENDED RELEASE ORAL at 09:04

## 2024-08-02 ASSESSMENT — PAIN SCALES - GENERAL: PAINLEVEL_OUTOF10: 0

## 2024-08-02 NOTE — PLAN OF CARE
Problem: Discharge Planning  Goal: Discharge to home or other facility with appropriate resources  Outcome: Adequate for Discharge     Problem: Anxiety  Goal: Will report anxiety at manageable levels  Description: INTERVENTIONS:  1. Administer medication as ordered  2. Teach and rehearse alternative coping skills  3. Provide emotional support with 1:1 interaction with staff  8/2/2024 1334 by Namrata Rod RN  Outcome: Adequate for Discharge  8/2/2024 1144 by Namrata Rod RN  Outcome: Progressing     Problem: Coping  Goal: Pt/Family able to verbalize concerns and demonstrate effective coping strategies  Description: INTERVENTIONS:  1. Assist patient/family to identify coping skills, available support systems and cultural and spiritual values  2. Provide emotional support, including active listening and acknowledgement of concerns of patient and caregivers  3. Reduce environmental stimuli, as able  4. Instruct patient/family in relaxation techniques, as appropriate  5. Assess for spiritual pain/suffering and initiate Spiritual Care, Psychosocial Clinical Specialist consults as needed  8/2/2024 1334 by Namrata Rod RN  Outcome: Adequate for Discharge  8/2/2024 1144 by Namrata Rod RN  Outcome: Progressing     Problem: Decision Making  Goal: Pt/Family able to effectively weigh alternatives and participate in decision making related to treatment and care  Description: INTERVENTIONS:  1. Determine when there are differences between patient's view, family's view, and healthcare provider's view of condition  2. Facilitate patient and family articulation of goals for care  3. Help patient and family identify pros/cons of alternative solutions  4. Provide information as requested by patient/family  5. Respect patient/family right to receive or not to receive information  6. Serve as a liaison between patient and family and health care team  7. Initiate Consults from Ethics, Palliative Care or

## 2024-08-02 NOTE — DISCHARGE SUMMARY
psychiatric treatment.  All members of the treatment team concur with each other in regards to plans for discharge today. Patient and family are aware and in agreement with discharge and discharge plan.         LABS AND IMAGAING:    Labs Reviewed   CBC WITH AUTO DIFFERENTIAL   COMPREHENSIVE METABOLIC PANEL   ETHANOL   URINE DRUG SCREEN     No results found for: \"PHEN\", \"PHENO\", \"PHENY\", \"PTN\", \"VALAC\", \"VALP\", \"CARB2\"  Admission on 07/31/2024   Component Date Value Ref Range Status    WBC 07/31/2024 10.0  4.1 - 11.1 K/uL Final    RBC 07/31/2024 5.22  4.10 - 5.70 M/uL Final    Hemoglobin 07/31/2024 14.9  12.1 - 17.0 g/dL Final    Hematocrit 07/31/2024 44.5  36.6 - 50.3 % Final    MCV 07/31/2024 85.2  80.0 - 99.0 FL Final    MCH 07/31/2024 28.5  26.0 - 34.0 PG Final    MCHC 07/31/2024 33.5  30.0 - 36.5 g/dL Final    RDW 07/31/2024 12.9  11.5 - 14.5 % Final    Platelets 07/31/2024 263  150 - 400 K/uL Final    MPV 07/31/2024 10.2  8.9 - 12.9 FL Final    Nucleated RBCs 07/31/2024 0.0  0  WBC Final    nRBC 07/31/2024 0.00  0.00 - 0.01 K/uL Final    Neutrophils % 07/31/2024 69  32 - 75 % Final    Lymphocytes % 07/31/2024 21  12 - 49 % Final    Monocytes % 07/31/2024 8  5 - 13 % Final    Eosinophils % 07/31/2024 1  0 - 7 % Final    Basophils % 07/31/2024 1  0 - 1 % Final    Immature Granulocytes % 07/31/2024 0  0.0 - 0.5 % Final    Neutrophils Absolute 07/31/2024 7.0  1.8 - 8.0 K/UL Final    Lymphocytes Absolute 07/31/2024 2.1  0.8 - 3.5 K/UL Final    Monocytes Absolute 07/31/2024 0.8  0.0 - 1.0 K/UL Final    Eosinophils Absolute 07/31/2024 0.1  0.0 - 0.4 K/UL Final    Basophils Absolute 07/31/2024 0.1  0.0 - 0.1 K/UL Final    Immature Granulocytes Absolute 07/31/2024 0.0  0.00 - 0.04 K/UL Final    Differential Type 07/31/2024 AUTOMATED    Final    Sodium 07/31/2024 144  136 - 145 mmol/L Final    Potassium 07/31/2024 3.9  3.5 - 5.1 mmol/L Final    Chloride 07/31/2024 106  97 - 108 mmol/L Final    CO2 07/31/2024 26   21 - 32 mmol/L Final    Anion Gap 07/31/2024 12  5 - 15 mmol/L Final    Glucose 07/31/2024 98  65 - 100 mg/dL Final    BUN 07/31/2024 12  6 - 20 MG/DL Final    Creatinine 07/31/2024 0.80  0.70 - 1.30 MG/DL Final    BUN/Creatinine Ratio 07/31/2024 15  12 - 20   Final    Est, Glom Filt Rate 07/31/2024 >90  >60 ml/min/1.73m2 Final    Calcium 07/31/2024 9.2  8.5 - 10.1 MG/DL Final    Total Bilirubin 07/31/2024 0.4  0.2 - 1.0 MG/DL Final    ALT 07/31/2024 38  12 - 78 U/L Final    AST 07/31/2024 17  15 - 37 U/L Final    Alk Phosphatase 07/31/2024 55  45 - 117 U/L Final    Total Protein 07/31/2024 7.5  6.4 - 8.2 g/dL Final    Albumin 07/31/2024 4.2  3.5 - 5.0 g/dL Final    Globulin 07/31/2024 3.3  2.0 - 4.0 g/dL Final    Albumin/Globulin Ratio 07/31/2024 1.3  1.1 - 2.2   Final    Ethanol Lvl 07/31/2024 <10  <10 MG/DL Final    Amphetamine, Urine 07/31/2024 Negative  NEG   Final    Barbiturates, Urine 07/31/2024 Negative  NEG   Final    Benzodiazepines, Urine 07/31/2024 Negative  NEG   Final    Cocaine, Urine 07/31/2024 Negative  NEG   Final    Methadone, Urine 07/31/2024 Negative  NEG   Final    Opiates, Urine 07/31/2024 Negative  NEG   Final    Phencyclidine, Urine 07/31/2024 Negative  NEG   Final    THC, TH-Cannabinol, Urine 07/31/2024 Negative  NEG   Final    Comments: 07/31/2024 (NOTE)   Final     No results found.                DISPOSITION:    Home. Patient to f/u with psychiatric and psychotherapy appointments. Patient is to f/u with all outpatient treatment as directed, including psychiatric, medical, and social appointments.               FOLLOW-UP CARE:    Follow-up Information       Follow up With Specialties Details Why Contact Info    Florentin Hong LPC  Follow up on 8/7/2024 You have an appointment for therapy on Wednesday August 8. [address] [place] [address] [phone]                  PROGNOSIS:   Fair ---- based on nature of patient's pathology/ies and treatment compliance issues.

## 2024-08-02 NOTE — BH NOTE
Patient discharged home to continue recommended plan of care. Patient received all personal belongings, valuables and discharge instructions. Patient ambulated to hospital exit doors and left via private vehicle. No acute distress voiced or observed.

## 2024-08-02 NOTE — DISCHARGE INSTRUCTIONS
DISCHARGE SUMMARY    NAME:Carter Dickson  : 2000  MRN: [id number]    The patient Carter Dickson exhibits the ability to control behavior in a less restrictive environment.  Patient's level of functioning is improving.  No assaultive/destructive behavior has been observed for the past 24 hours.  No suicidal/homicidal threat or behavior has been observed for the past 24 hours.  There is no evidence of serious medication side effects.  Patient has not been in physical or protective restraints for at least the past 24 hours.    If weapons involved, how are they secured? No weapons    Is patient aware of and in agreement with discharge plan? Yes    Arrangements for medication:  Prescriptions sent to preferred pharmacy    Copy of discharge instructions to provider?:  Yes    Arrangements for transportation home:  Driving self    Keep all follow up appointments as scheduled, continue to take prescribed medications per physician instructions.  Mental health crisis number:  911 or your local mental health crisis line number at [phone]      Mental Health Emergency WARM LINE      3-693-567-MHAV (6428)      M-F: 9am to 9pm      Sat & Sun: 5pm - 9pm  National suicide prevention lines:                             6-061-JFSVVSJ (9-305-658-4840)       4-196-338-TALK (1-170.629.4418)    Crisis Text Line:  Text HOME to 007442    SUICIDE HOTLINES:    If you or someone you care about is suicidal, please contact any of the following toll-free 24-hour crisis hotlines for suicide prevention and support. Your call is free and confidential.    National Suicide Prevention Lifeline at 988, www.suicidepreventionlifeline.org    The National Hopeline Network at 4-892-103-HOPE (9948)    You may also contact [employer] at www.[employer] to access a live online network of volunteers trained and certified in crisis intervention or text HOME to 939490 to be connected with a live, trained crisis counselor through Crisis Text Line who will  residents. They also operate affordable senior living communities. There is a different  for each community. Contact information varies for each unit and is included with the property listing on the website. Contact them directly to learn more about each unit and its availability.        [facility]    [address] [place] [address], [phone]    Website: http://www.[employer]/    Hours of Operation: 9:00 AM - 4:00 PM    Service Area: Rental units available in the [facility].    Notes: [employer] owns and operates residential housing. Income limit is $26,000. Units include a private room with shared living space. Cost is $450 a month with all utilities included. Housing units are not co-ed. Applications can be found at [employer]'s office (also a [employer] store) at the counter.        [employer] [place] ([employer])    [address] [place] [address], [phone]    E-mail: help@[employer], Website: [employer]    Hours of Operation: Monday - Friday 9:00 AM - 5:00 PM    Service Area: [facility] and [facility] [place]    Notes: [employer] is a [facility] approved housing counseling organization. They provide homebuyer education, money management and credit recovery classes, pre-purchase counseling, down payment assistance to qualifying individuals, foreclosure prevention counseling, and tenant workshops for renters. For counseling services, intake forms can be downloaded from [employer]'s website. Call or email to learn more about class and workshop offerings. Can provide services in Slovenian.        [facility] ([facility])    [address] [place] [address], [phone] (p), [phone] (f)    E-mail: info@[employer].org, Website: www.in[employer]up.org    Point of Contact: Noreen Cole    Hours of Operation: Monday Friday 9:00 AM -- 5:00 PM    Service Area: [place]    Notes: KHOA

## 2024-08-02 NOTE — BH NOTE
Met with patient lying down resting in bed. Flat affect. Depressed mood. Anxious. Denies SI, HI and AVH. CSSRS No Risk. Remains guarded and isolative to self. Discharge focused. Compliant with medications. No side effects reported. Remains on Q15 minute rounds for safety.
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
## 2024-08-02 NOTE — PLAN OF CARE
Problem: Safety - Adult  Goal: Free from fall injury  Outcome: Progressing     Problem: Pain  Goal: Verbalizes/displays adequate comfort level or baseline comfort level  Outcome: Progressing     Problem: Self Harm/Suicidality  Goal: Will have no self-injury during hospital stay  Description: INTERVENTIONS:  1.  Ensure constant observer at bedside with Q15M safety checks  2.  Maintain a safe environment  3.  Secure patient belongings  4.  Ensure family/visitors adhere to safety recommendations  5.  Ensure safety tray has been added to patient's diet order  6.  Every shift and PRN: Re-assess suicidal risk via Frequent Screener    Outcome: Progressing

## 2024-08-02 NOTE — GROUP NOTE
Group Therapy Note    Date: 8/2/2024    Group Start Time: 1000  Group End Time: 1100  Group Topic: Relaxation    [facility] 3 ACUTE BEHAV [facility]    Lauren Brown        Group Therapy Note    Attendees: 5       Patient's Goal:  To participate in relaxation activity    Status After Intervention:  Improved    Participation Level: Active Listener and Interactive    Participation Quality: Appropriate, Attentive, and Sharing      Speech:  normal      Thought Process/Content: Logical      Affective Functioning: Congruent        Level of consciousness:  Alert, Oriented x4, and Attentive      Response to Learning: Progressing to goal      Endings: None Reported    Modes of Intervention: Activity      Discipline Responsible: Recreational Therapist      Signature:  LAUREN BROWN

## 2024-08-02 NOTE — BH NOTE
Evening assessment complete. Patient was encountered in the hallway. VS are stable. He is calm and cooperative. Patient reports that he is currently stressed because he just received a phone call about finances. Eye contact is noted to be fair. Mood is noted to be anxious and depressed. Affect is constricted.    Patient currently reports that there are no signs or symptoms of depression or anxiety,also denies all SI/HI, AVH. C-SSRS level is No risk.     Patient has been isolative to himself but visible on the unit at times.     Patient is med compliant. There are no untoward side effects from medications to note.     Patient reports that they have set a goal to accomplish going home soon.        Hourly rounds are being completed to assure patient safety and attend to care needs. Monitoring will continue for changes in patient condition       SLEEP HOURS- 8

## 2024-08-05 ENCOUNTER — CARE COORDINATION (OUTPATIENT)
Dept: OTHER | Facility: CLINIC | Age: 24
End: 2024-08-05

## 2024-08-05 SDOH — HEALTH STABILITY: MENTAL HEALTH: HOW MANY STANDARD DRINKS CONTAINING ALCOHOL DO YOU HAVE ON A TYPICAL DAY?: 1 OR 2

## 2024-08-05 SDOH — SOCIAL STABILITY: SOCIAL INSECURITY
WITHIN THE LAST YEAR, HAVE YOU BEEN KICKED, HIT, SLAPPED, OR OTHERWISE PHYSICALLY HURT BY YOUR PARTNER OR EX-PARTNER?: NO

## 2024-08-05 SDOH — SOCIAL STABILITY: SOCIAL NETWORK
DO YOU BELONG TO ANY CLUBS OR ORGANIZATIONS SUCH AS CHURCH GROUPS UNIONS, FRATERNAL OR ATHLETIC GROUPS, OR SCHOOL GROUPS?: YES

## 2024-08-05 SDOH — ECONOMIC STABILITY: TRANSPORTATION INSECURITY
IN THE PAST 12 MONTHS, HAS LACK OF TRANSPORTATION KEPT YOU FROM MEETINGS, WORK, OR FROM GETTING THINGS NEEDED FOR DAILY LIVING?: NO

## 2024-08-05 SDOH — SOCIAL STABILITY: SOCIAL INSECURITY: WITHIN THE LAST YEAR, HAVE YOU BEEN HUMILIATED OR EMOTIONALLY ABUSED IN OTHER WAYS BY YOUR PARTNER OR EX-PARTNER?: NO

## 2024-08-05 SDOH — HEALTH STABILITY: PHYSICAL HEALTH: ON AVERAGE, HOW MANY DAYS PER WEEK DO YOU ENGAGE IN MODERATE TO STRENUOUS EXERCISE (LIKE A BRISK WALK)?: 5 DAYS

## 2024-08-05 SDOH — ECONOMIC STABILITY: TRANSPORTATION INSECURITY
IN THE PAST 12 MONTHS, HAS THE LACK OF TRANSPORTATION KEPT YOU FROM MEDICAL APPOINTMENTS OR FROM GETTING MEDICATIONS?: NO

## 2024-08-05 SDOH — ECONOMIC STABILITY: FOOD INSECURITY: WITHIN THE PAST 12 MONTHS, YOU WORRIED THAT YOUR FOOD WOULD RUN OUT BEFORE YOU GOT MONEY TO BUY MORE.: SOMETIMES TRUE

## 2024-08-05 SDOH — HEALTH STABILITY: MENTAL HEALTH: HOW OFTEN DO YOU HAVE A DRINK CONTAINING ALCOHOL?: MONTHLY OR LESS

## 2024-08-05 SDOH — ECONOMIC STABILITY: FOOD INSECURITY: WITHIN THE PAST 12 MONTHS, THE FOOD YOU BOUGHT JUST DIDN'T LAST AND YOU DIDN'T HAVE MONEY TO GET MORE.: SOMETIMES TRUE

## 2024-08-05 SDOH — HEALTH STABILITY: PHYSICAL HEALTH: ON AVERAGE, HOW MANY MINUTES DO YOU ENGAGE IN EXERCISE AT THIS LEVEL?: 20 MIN

## 2024-08-05 SDOH — SOCIAL STABILITY: SOCIAL INSECURITY
WITHIN THE LAST YEAR, HAVE TO BEEN RAPED OR FORCED TO HAVE ANY KIND OF SEXUAL ACTIVITY BY YOUR PARTNER OR EX-PARTNER?: NO

## 2024-08-05 SDOH — SOCIAL STABILITY: SOCIAL NETWORK: HOW OFTEN DO YOU ATTEND CHURCH OR RELIGIOUS SERVICES?: NEVER

## 2024-08-05 SDOH — SOCIAL STABILITY: SOCIAL INSECURITY: WITHIN THE LAST YEAR, HAVE YOU BEEN AFRAID OF YOUR PARTNER OR EX-PARTNER?: NO

## 2024-08-05 SDOH — SOCIAL STABILITY: SOCIAL NETWORK: ARE YOU MARRIED, WIDOWED, DIVORCED, SEPARATED, NEVER MARRIED, OR LIVING WITH A PARTNER?: NEVER MARRIED

## 2024-08-05 SDOH — HEALTH STABILITY: MENTAL HEALTH
STRESS IS WHEN SOMEONE FEELS TENSE, NERVOUS, ANXIOUS, OR CAN'T SLEEP AT NIGHT BECAUSE THEIR MIND IS TROUBLED. HOW STRESSED ARE YOU?: RATHER MUCH

## 2024-08-05 SDOH — ECONOMIC STABILITY: HOUSING INSECURITY
IN THE LAST 12 MONTHS, WAS THERE A TIME WHEN YOU DID NOT HAVE A STEADY PLACE TO SLEEP OR SLEPT IN A SHELTER (INCLUDING NOW)?: NO

## 2024-08-05 SDOH — ECONOMIC STABILITY: HOUSING INSECURITY: IN THE LAST 12 MONTHS, HOW MANY PLACES HAVE YOU LIVED?: 1

## 2024-08-05 SDOH — SOCIAL STABILITY: SOCIAL NETWORK: HOW OFTEN DO YOU ATTENT MEETINGS OF THE CLUB OR ORGANIZATION YOU BELONG TO?: 1 TO 4 TIMES PER YEAR

## 2024-08-05 SDOH — ECONOMIC STABILITY: INCOME INSECURITY: IN THE LAST 12 MONTHS, WAS THERE A TIME WHEN YOU WERE NOT ABLE TO PAY THE MORTGAGE OR RENT ON TIME?: NO

## 2024-08-05 SDOH — ECONOMIC STABILITY: INCOME INSECURITY: HOW HARD IS IT FOR YOU TO PAY FOR THE VERY BASICS LIKE FOOD, HOUSING, MEDICAL CARE, AND HEATING?: NOT VERY HARD

## 2024-08-05 SDOH — SOCIAL STABILITY: SOCIAL NETWORK: HOW OFTEN DO YOU GET TOGETHER WITH FRIENDS OR RELATIVES?: MORE THAN THREE TIMES A WEEK

## 2024-08-05 SDOH — SOCIAL STABILITY: SOCIAL NETWORK: IN A TYPICAL WEEK, HOW MANY TIMES DO YOU TALK ON THE PHONE WITH FAMILY, FRIENDS, OR NEIGHBORS?: THREE TIMES A WEEK

## 2024-08-05 ASSESSMENT — PATIENT HEALTH QUESTIONNAIRE - PHQ9
SUM OF ALL RESPONSES TO PHQ QUESTIONS 1-9: 2
SUM OF ALL RESPONSES TO PHQ QUESTIONS 1-9: 2
SUM OF ALL RESPONSES TO PHQ9 QUESTIONS 1 & 2: 2
SUM OF ALL RESPONSES TO PHQ QUESTIONS 1-9: 2
2. FEELING DOWN, DEPRESSED OR HOPELESS: SEVERAL DAYS
SUM OF ALL RESPONSES TO PHQ QUESTIONS 1-9: 2
DEPRESSION UNABLE TO ASSESS: FUNCTIONAL CAPACITY MOTIVATION LIMITS ACCURACY
1. LITTLE INTEREST OR PLEASURE IN DOING THINGS: SEVERAL DAYS
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
## 2024-08-13 ENCOUNTER — CARE COORDINATION (OUTPATIENT)
Dept: OTHER | Facility: CLINIC | Age: 24
End: 2024-08-13

## 2024-08-13 NOTE — CARE COORDINATION
Care Transitions Note    Follow Up Call     Patient Current Location:  [place]    Care Transition Nurse contacted the patient by telephone and the patient returned the call. Verified name and  as identifiers.    Additional needs identified to be addressed with provider   No needs identified                 Method of communication with provider: none.    Care Summary Note: The patient returned the ACM's call and reported worsening depression and endorsed suicidal ideations. The ACM conducted a Ida Suicide Severity Rating Scale (C-SSRS) assessment. When asked if the patient had a plan, he responded with \"sort of\" but did not provide further details. Given the patient's history of mental health issues and recent initiation of an antidepressant, the risk for a suicide attempt was assessed as high. The ACM contacted another care manager while keeping the patient on the line, and the care manager promptly contacted 911. This ACM attempted to stay on the line until police arrived on scene but patient hung up, ACM attempted to call back with no answer.     The patient has a behavioral therapy session scheduled for tomorrow but does not yet have an appointment with [facility] for medication management. Although the patient has completed the required forms and requested a telehealth visit, he has not yet received confirmation of the appointment date and time.    Plan of care updates since last contact:  Patient is endorsing suicidal ideation with a vague plan, responding with \"sort of\" when asked for details, but is unwilling to elaborate further.     Advance Care Planning:   Does patient have an Advance Directive: patient declined education and reviewed during previous call, see note. .    Medication Review:  Full medication reconciliation completed during previous call.    Remote Patient Monitoring:  Offered patient enrollment in the Remote Patient Monitoring (RPM) program for in-home monitoring: Patient is not eligible

## 2024-08-13 NOTE — CARE COORDINATION
Care Transitions Note    Follow Up Call     Attempted to reach patient for transitions of care follow up.  Unable to reach patient.      Outreach Attempts:   HIPAA compliant voicemail left for patient.     Care Summary Note: HIPAA compliant voicemail left for patient.      Plan for follow-up call in 6-10 days based on severity of symptoms and risk factors. Plan for next call:   symptom management-Coping  self management-SI/HI?  follow-up appointment-With [facility]    Cecily Dhillon RN

## 2024-08-14 ENCOUNTER — CARE COORDINATION (OUTPATIENT)
Dept: OTHER | Facility: CLINIC | Age: 24
End: 2024-08-14

## 2024-08-14 NOTE — CARE COORDINATION
Care Transitions Note    Follow Up Call     Attempted to reach patient for transitions of care follow up.  Unable to reach patient.      Outreach Attempts:   HIPAA compliant voicemail left for patient.     Care Summary Note: HIPAA compliant voicemail left for patient.      Plan for follow-up call in 6-10 days based on severity of symptoms and risk factors. Plan for next call:   symptom management-SI, mood, coping  follow-up appointment-[facility]    Cecily Dhillon RN

## 2024-08-20 ENCOUNTER — CARE COORDINATION (OUTPATIENT)
Dept: OTHER | Facility: CLINIC | Age: 24
End: 2024-08-20

## 2024-08-20 NOTE — CARE COORDINATION
Care Transitions Note    Initial Call - Call within 2 business days of discharge: Yes    Attempted to reach patient for transitions of care follow up. Unable to reach patient.    Outreach Attempts:   HIPAA compliant voicemail left for patient.     Patient: Carter Dickson    Patient : 2000   MRN: [id number]    Reason for Admission: Suicide Attempt  Discharge Date: 24  RURS: Readmission Risk Score: 6    Last Discharge Facility       Date Complaint Diagnosis Description Type Department Provider    24 Mental Health Problem Suicidal ideation ... ED to Hosp-Admission (Discharged) (ADMITTED) [id number] Deyanira Hunter MD; [place],...            Was this an external facility discharge? Yes. Discharge Date: 24. Facility Name: [facility]    Follow Up Appointment:   Patient does not have a follow up appointment scheduled at time of call.  Will address when contact with ACM is made.      Plan for follow-up on next business day.      Cecily Dhillon RN

## 2024-08-21 ENCOUNTER — CARE COORDINATION (OUTPATIENT)
Dept: OTHER | Facility: CLINIC | Age: 24
End: 2024-08-21

## 2024-08-21 NOTE — CARE COORDINATION
Care Transitions Note    Initial Call - Call within 2 business days of discharge: Yes    Attempted to reach patient for transitions of care follow up. Unable to reach patient.    Outreach Attempts:   Multiple attempts to contact patient at phone numbers on file.   HIPAA compliant voicemail left for patient.     Patient: Carter Dickson    Patient : 2000   MRN: [id number]    Reason for Admission: Suicide Attempt  Discharge Date: 24  RURS: Readmission Risk Score: 6    Last Discharge Facility       Date Complaint Diagnosis Description Type Department Provider    24 Mental Health Problem Suicidal ideation ... ED to Hosp-Admission (Discharged) (ADMITTED) [id number] Deyanira Hunter MD; [place],...            Was this an external facility discharge? Yes. Discharge Date: 24. Facility Name: [facility]     Follow Up Appointment:   Patient does not have a follow up appointment scheduled at time of call.  Will address when contact with patient is made.    Plan for follow-up call in 6-10 days     Cecily Dhillon RN

## 2024-08-21 NOTE — CARE COORDINATION
Care Transitions Note    Initial Call - Call within 2 business days of discharge: Yes    Attempted to reach patient for transitions of care follow up. Unable to reach patient.    Outreach Attempts:   HIPAA compliant voicemail left for patient.     Patient: Carter Dickson    Patient : 2000   MRN: [id number]    Reason for Admission: suicide attempt  Discharge Date: 24  RURS: Readmission Risk Score: 6        Was this an external facility discharge? Yes. Discharge Date: 24. Facility Name: [facility]      Plan for follow-up call in 6-10 days     Lucia Pimentel RN   Ambulatory Care Manager  Cell [phone]  [email or web address]

## 2024-08-26 ENCOUNTER — CARE COORDINATION (OUTPATIENT)
Dept: OTHER | Facility: CLINIC | Age: 24
End: 2024-08-26

## 2024-08-26 NOTE — CARE COORDINATION
Care Transitions Note    Initial Call - Call within 2 business days of discharge: Yes    Attempted to reach patient for transitions of care follow up.  Unable to reach patient.      Outreach Attempts:   Multiple attempts to contact patient at phone numbers on file.   HIPAA compliant voicemail left for patient.     Patient closed (unable to reach patient) from the Care Transitions program on 08/26/24.  Patient/family unable to progress towards self management. .      Handoff:   Patient was not referred to the ACM team due to unable to contact patient.      Care Summary Note: ACM unable to reach patient after multiple attempts and messages sent. ACM signed off.      Cecily Dhillon RN

## 2024-11-12 ENCOUNTER — APPOINTMENT (OUTPATIENT)
Facility: HOSPITAL | Age: 24
End: 2024-11-12
Payer: MEDICARE

## 2024-11-12 ENCOUNTER — HOSPITAL ENCOUNTER (EMERGENCY)
Facility: HOSPITAL | Age: 24
Discharge: HOME | End: 2024-11-12
Attending: EMERGENCY MEDICINE
Payer: MEDICARE

## 2024-11-12 VITALS
SYSTOLIC BLOOD PRESSURE: 135 MMHG | HEIGHT: 67 IN | DIASTOLIC BLOOD PRESSURE: 76 MMHG | TEMPERATURE: 98.2 F | OXYGEN SATURATION: 95 % | BODY MASS INDEX: 29.82 KG/M2 | WEIGHT: 190 LBS | HEART RATE: 87 BPM | RESPIRATION RATE: 18 BRPM

## 2024-11-12 DIAGNOSIS — S09.90XA INJURY OF HEAD, INITIAL ENCOUNTER: Primary | ICD-10-CM
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
## 2024-11-12 DIAGNOSIS — S01.01XA LACERATION OF SCALP, INITIAL ENCOUNTER: ICD-10-CM

## 2024-11-12 PROCEDURE — 6370000000 HC RX 637 (ALT 250 FOR IP): Performed by: EMERGENCY MEDICINE

## 2024-11-12 PROCEDURE — 12002 RPR S/N/AX/GEN/TRNK2.6-7.5CM: CPT

## 2024-11-12 PROCEDURE — 70450 CT HEAD/BRAIN W/O DYE: CPT

## 2024-11-12 PROCEDURE — 99284 EMERGENCY DEPT VISIT MOD MDM: CPT

## 2024-11-12 RX ORDER — ACETAMINOPHEN 500 MG
1000 TABLET ORAL
Status: COMPLETED | OUTPATIENT
Start: 2024-11-12 | End: 2024-11-12

## 2024-11-12 RX ADMIN — ACETAMINOPHEN 1000 MG: 500 TABLET ORAL at 03:52

## 2024-11-12 RX ADMIN — Medication 3 ML: at 03:23

## 2024-11-12 ASSESSMENT — ENCOUNTER SYMPTOMS
SHORTNESS OF BREATH: 0
ABDOMINAL PAIN: 0
NAUSEA: 0
DIARRHEA: 0
VOMITING: 0

## 2024-11-12 ASSESSMENT — LIFESTYLE VARIABLES
HOW MANY STANDARD DRINKS CONTAINING ALCOHOL DO YOU HAVE ON A TYPICAL DAY: PATIENT DOES NOT DRINK
HOW OFTEN DO YOU HAVE A DRINK CONTAINING ALCOHOL: NEVER

## 2024-11-12 ASSESSMENT — PAIN - FUNCTIONAL ASSESSMENT: PAIN_FUNCTIONAL_ASSESSMENT: 0-10

## 2024-11-12 ASSESSMENT — PAIN SCALES - GENERAL: PAINLEVEL_OUTOF10: 4
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
## 2024-11-12 NOTE — DISCHARGE INSTRUCTIONS
It was a pleasure taking care of you in our Emergency Department today.  We know that when you come to [facility], you are entrusting us with your health, comfort, and safety.  Our physicians and nurses honor that trust, and truly appreciate the opportunity to care for you and your loved ones.      We also value your feedback.  If you receive a survey about your Emergency Department experience today, please fill it out.  We care about our patients' feedback, and we listen to what you have to say.  Thank you!      Dr. Lorena Cueva MD.
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
## 2024-11-13 ENCOUNTER — CARE COORDINATION (OUTPATIENT)
Dept: OTHER | Facility: CLINIC | Age: 24
End: 2024-11-13

## 2024-11-13 NOTE — CARE COORDINATION
Ambulatory Care Coordination Note     11/13/2024 9:20 AM     Patient Current Location:  [place]     This patient was received as a referral from [facility] .    ACM contacted the patient by telephone.Provided introduction to self, and explanation of the ACM role.   Patient declined care management services at this time.          KAIN: Lenka Jaquez    Utilization: N/A - Initial Call     Care Summary Note: ACM called patient today for an initial call following his most recent ED visit for a head injury. Patient answered and allowed ACM to explain role. Patient stated \"unless you can get me pain medication I don't have any other needs.\" ACM explain only one of his providers would be able to prescribe medication. ACM asked if the patient had a follow up appt for the removal of his staples and patient stated he was told he could go to any  for the removal. Patient declined any further CM services at this time. ACM left number for any future needs. ACM will sing off for now.           Matilde AU RN  Ambulatory    [phone]  [email or web address]

## 2024-11-14 ENCOUNTER — OFFICE VISIT (OUTPATIENT)
Age: 24
End: 2024-11-14

## 2024-11-14 VITALS
TEMPERATURE: 98.9 F | RESPIRATION RATE: 18 BRPM | OXYGEN SATURATION: 98 % | BODY MASS INDEX: 28.82 KG/M2 | SYSTOLIC BLOOD PRESSURE: 138 MMHG | DIASTOLIC BLOOD PRESSURE: 88 MMHG | HEART RATE: 96 BPM | WEIGHT: 184 LBS

## 2024-11-14 DIAGNOSIS — J06.9 UPPER RESPIRATORY TRACT INFECTION, UNSPECIFIED TYPE: Primary | ICD-10-CM

## 2024-11-14 LAB
INFLUENZA A ANTIGEN, POC: NORMAL
INFLUENZA B ANTIGEN, POC: NORMAL
Lab: NORMAL
PERFORMING INSTRUMENT: NORMAL
QC PASS/FAIL: NORMAL
SARS-COV-2, POC: NORMAL

## 2024-11-14 RX ORDER — GUAIFENESIN/DEXTROMETHORPHAN 100-10MG/5
5 SYRUP ORAL 3 TIMES DAILY PRN
Qty: 120 ML | Refills: 0 | Status: SHIPPED | OUTPATIENT
Start: 2024-11-14 | End: 2024-11-24

## 2024-11-14 RX ORDER — AZITHROMYCIN 250 MG/1
TABLET, FILM COATED ORAL
Qty: 6 TABLET | Refills: 0 | Status: SHIPPED | OUTPATIENT
Start: 2024-11-14 | End: 2024-11-24

## 2024-11-14 RX ORDER — BENZONATATE 200 MG/1
200 CAPSULE ORAL 3 TIMES DAILY PRN
Qty: 21 CAPSULE | Refills: 0 | Status: SHIPPED | OUTPATIENT
Start: 2024-11-14 | End: 2024-11-21

## 2024-11-14 RX ORDER — IBUPROFEN 800 MG/1
800 TABLET, FILM COATED ORAL EVERY 8 HOURS PRN
Qty: 60 TABLET | Refills: 0 | Status: SHIPPED | OUTPATIENT
Start: 2024-11-14

## 2024-11-14 NOTE — PATIENT INSTRUCTIONS
Discussed with patient COVID and flu test are negative most likely viral upper respiratory infection can use cough suppressant medication as needed also need plenty of sleep and rest and plenty of fluids can use warm liquids to help soothe throat like tea with honey and lemon or soups can alternate acetaminophen and ibuprofen to help with fevers if taking ibuprofen need to make sure takes with food to minimize GI upset , discussed with patient will prescribe antibiotic in case symptoms worsen over next 5 days, if worsening coughing, SOB, fevers/chills persist or symptoms improve and then worsen on day 5/6 can start antibiotic otherwise should not use. also discussed with patient can prescribe 800 mg ibuprofen to take every 8 hours with food to help with head pain discussed need to make sure evaluating her having someone else check to make sure there is no swelling redness or purulent discharge around injury or staples prior to removal if any signs of infection should go immediately to the emergency department

## 2024-11-14 NOTE — PROGRESS NOTES
Carter Dickson (:  2000) is a 24 y.o. male,New patient, here for evaluation of the following chief complaint(s):  Cold Symptoms (Fever, cough and body aches x yesterday. )          ASSESSMENT/PLAN:    Assessment & Plan  Upper respiratory tract infection, unspecified type       Orders:    POCT COVID-19, Antigen    POCT Influenza A/B Antigen  Discussed with patient COVID and flu test are negative most likely viral upper respiratory infection can use cough suppressant medication as needed also need plenty of sleep and rest and plenty of fluids can use warm liquids to help soothe throat like tea with honey and lemon or soups can alternate acetaminophen and ibuprofen to help with fevers if taking ibuprofen need to make sure takes with food to minimize GI upset , discussed with patient will prescribe antibiotic in case symptoms worsen over next 5 days, if worsening coughing, SOB, fevers/chills persist or symptoms improve and then worsen on day 5/6 can start antibiotic otherwise should not use. also discussed with patient can prescribe 800 mg ibuprofen to take every 8 hours with food to help with head pain discussed need to make sure evaluating her having someone else check to make sure there is no swelling redness or purulent discharge around injury or staples prior to removal if any signs of infection should go immediately to the emergency department       I have discussed the results, diagnosis and treatment plan with the patient. The patient also understands that early in the process of an illness, an urgent care workup can be falsely reassuring. Routine discharge counseling and specific return precautions discussed with patient and the patient understands that worsening, changing or persistent symptoms should prompt an immediate return to the urgent care or emergency department. Patient/Guardian expressed understanding and agrees with the discharge plan. No further questions at time of discharge.

## 2024-11-18 ENCOUNTER — APPOINTMENT (OUTPATIENT)
Facility: HOSPITAL | Age: 24
End: 2024-11-18
Payer: MEDICARE

## 2024-11-18 ENCOUNTER — HOSPITAL ENCOUNTER (EMERGENCY)
Facility: HOSPITAL | Age: 24
Discharge: HOME | End: 2024-11-18
Attending: STUDENT IN AN ORGANIZED HEALTH CARE EDUCATION/TRAINING PROGRAM
Payer: MEDICARE

## 2024-11-18 VITALS
BODY MASS INDEX: 28.09 KG/M2 | TEMPERATURE: 99.3 F | OXYGEN SATURATION: 95 % | WEIGHT: 179 LBS | DIASTOLIC BLOOD PRESSURE: 80 MMHG | HEART RATE: 87 BPM | HEIGHT: 67 IN | RESPIRATION RATE: 26 BRPM | SYSTOLIC BLOOD PRESSURE: 134 MMHG

## 2024-11-18 DIAGNOSIS — B34.9 VIRAL SYNDROME: Primary | ICD-10-CM

## 2024-11-18 PROCEDURE — 96372 THER/PROPH/DIAG INJ SC/IM: CPT

## 2024-11-18 PROCEDURE — 71046 X-RAY EXAM CHEST 2 VIEWS: CPT

## 2024-11-18 PROCEDURE — 99284 EMERGENCY DEPT VISIT MOD MDM: CPT

## 2024-11-18 PROCEDURE — 6370000000 HC RX 637 (ALT 250 FOR IP): Performed by: STUDENT IN AN ORGANIZED HEALTH CARE EDUCATION/TRAINING PROGRAM

## 2024-11-18 PROCEDURE — 6360000002 HC RX W HCPCS: Performed by: STUDENT IN AN ORGANIZED HEALTH CARE EDUCATION/TRAINING PROGRAM

## 2024-11-18 RX ORDER — ACETAMINOPHEN 500 MG
1000 TABLET ORAL EVERY 6 HOURS PRN
Qty: 60 TABLET | Refills: 0 | Status: SHIPPED | OUTPATIENT
Start: 2024-11-18

## 2024-11-18 RX ORDER — ACETAMINOPHEN 500 MG
1000 TABLET ORAL
Status: COMPLETED | OUTPATIENT
Start: 2024-11-18 | End: 2024-11-18

## 2024-11-18 RX ORDER — KETOROLAC TROMETHAMINE 30 MG/ML
15 INJECTION, SOLUTION INTRAMUSCULAR; INTRAVENOUS ONCE
Status: COMPLETED | OUTPATIENT
Start: 2024-11-18 | End: 2024-11-18

## 2024-11-18 RX ADMIN — ACETAMINOPHEN 1000 MG: 500 TABLET ORAL at 12:58

## 2024-11-18 RX ADMIN — KETOROLAC TROMETHAMINE 15 MG: 30 INJECTION, SOLUTION INTRAMUSCULAR at 12:59

## 2024-11-18 ASSESSMENT — PAIN DESCRIPTION - LOCATION: LOCATION: GENERALIZED

## 2024-11-18 ASSESSMENT — PAIN SCALES - GENERAL
PAINLEVEL_OUTOF10: 6
PAINLEVEL_OUTOF10: 0

## 2024-11-18 ASSESSMENT — PAIN - FUNCTIONAL ASSESSMENT: PAIN_FUNCTIONAL_ASSESSMENT: 0-10

## 2024-11-18 ASSESSMENT — PAIN DESCRIPTION - DESCRIPTORS: DESCRIPTORS: ACHING

## 2024-11-18 NOTE — ED NOTES
Pt presents ambulatory to ED complaining of dry cough, generalized body aches, intermittent fever x1 week. He reports new onset of diarrhea upon waking up this morning. He denies hx of asthma. He reports being seen at an Urgent Care for similar symptoms and discharged home with medication. He reports taking the liquid medication with no relief. Pt is alert and oriented x 4, RR even and unlabored, skin is warm and dry. Assessment completed and pt updated on plan of care.        Emergency Department Nursing Plan of Care        The Nursing Plan of Care is developed from the Nursing assessment and Emergency Department Attending provider initial evaluation.  The plan of care may be reviewed in the “ED Provider note”.

## 2024-11-18 NOTE — ED NOTES
Discharge instructions were given to the patient by Eloisa Aponte RN  .     The patient left the Emergency Department ambulatory, alert and oriented and in no acute distress with 1 prescriptions. The patient was encouraged to call or return to the ED for worsening issues or problems and was encouraged to schedule a follow up appointment for continuing care. Patient discharged home ambulatory with a steady gait.    The patient verbalized understanding of discharge instructions and prescriptions, all questions were answered. The patient has no further concerns at this time.
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
## 2024-11-18 NOTE — ED TRIAGE NOTES
Pt reports cough, runny nose, diarrhea, nausea, decreased food intake, body aches, chills, x 1 week. Pt reports being seen at urgent care but could not afford medications

## 2024-11-20 ENCOUNTER — OFFICE VISIT (OUTPATIENT)
Age: 24
End: 2024-11-20

## 2024-11-20 VITALS
WEIGHT: 176 LBS | DIASTOLIC BLOOD PRESSURE: 79 MMHG | OXYGEN SATURATION: 96 % | SYSTOLIC BLOOD PRESSURE: 123 MMHG | HEART RATE: 97 BPM | BODY MASS INDEX: 27.57 KG/M2 | TEMPERATURE: 98.1 F

## 2024-11-20 DIAGNOSIS — J40 BRONCHITIS: Primary | ICD-10-CM

## 2024-11-20 DIAGNOSIS — R06.03 RESPIRATORY DISTRESS: ICD-10-CM

## 2024-11-20 DIAGNOSIS — Z48.02 ENCOUNTER FOR STAPLE REMOVAL: ICD-10-CM

## 2024-11-20 RX ORDER — ALBUTEROL SULFATE 90 UG/1
2 INHALANT RESPIRATORY (INHALATION) 4 TIMES DAILY PRN
Qty: 54 G | Refills: 0 | Status: SHIPPED | OUTPATIENT
Start: 2024-11-20

## 2024-11-20 RX ORDER — DOXYCYCLINE HYCLATE 100 MG
100 TABLET ORAL 2 TIMES DAILY
Qty: 14 TABLET | Refills: 0 | Status: SHIPPED | OUTPATIENT
Start: 2024-11-20 | End: 2024-11-27

## 2024-11-20 NOTE — PATIENT INSTRUCTIONS
Discussed with patient head injury appears to be healed some scabbing to area can wash head with soap and water or shampoo try not to scrub out area for the next few days to continue to let it heal completely and do not irritate skin patient is on day 2 of azithromycin however concern has here both wheezing and rhonchi in patient's lungs chest x-ray was negative a couple days ago in the ER however patient appears to have severe coughing with shortness of breath discussed with patient to continue the azithromycin also to use albuterol every 4 hours while awake for the next 3 days and then every 4-6 hours as needed for shortness of breath coughing or wheezing we will also add Augmentin to patient's regimen can see how he feels tomorrow if symptoms continue to be this bad to please start Augmentin twice a day for 7 days with food if symptoms do not improve over the next 5 days please return to urgent care or emergency department for further evaluation

## 2024-11-20 NOTE — PROGRESS NOTES
Carter Dickson (:  2000) is a 24 y.o. male,Established patient, here for evaluation of the following chief complaint(s):  Suture / Staple Removal          ASSESSMENT/PLAN:    Assessment & Plan  Bronchitis            Encounter for staple removal            Respiratory distress          Discussed with patient head injury appears to be healed some scabbing to area can wash head with soap and water or shampoo try not to scrub out area for the next few days to continue to let it heal completely and do not irritate skin patient is on day 2 of azithromycin however concern has here both wheezing and rhonchi in patient's lungs chest x-ray was negative a couple days ago in the ER however patient appears to have severe coughing with shortness of breath discussed with patient to continue the azithromycin also to use albuterol every 4 hours while awake for the next 3 days and then every 4-6 hours as needed for shortness of breath coughing or wheezing we will also add Augmentin to patient's regimen can see how he feels tomorrow if symptoms continue to be this bad to please start Augmentin twice a day for 7 days with food if symptoms do not improve over the next 5 days please return to urgent care or emergency department for further evaluation       I have discussed the results, diagnosis and treatment plan with the patient. The patient also understands that early in the process of an illness, an urgent care workup can be falsely reassuring. Routine discharge counseling and specific return precautions discussed with patient and the patient understands that worsening, changing or persistent symptoms should prompt an immediate return to the urgent care or emergency department. Patient/Guardian expressed understanding and agrees with the discharge plan. No further questions at time of discharge.     SUBJECTIVE/OBJECTIVE:  24 y.o. male presents with symptoms of Suture / Staple Removal      24-year-old male presents to urgent